# Patient Record
Sex: FEMALE | Race: WHITE | NOT HISPANIC OR LATINO | Employment: UNEMPLOYED | URBAN - METROPOLITAN AREA
[De-identification: names, ages, dates, MRNs, and addresses within clinical notes are randomized per-mention and may not be internally consistent; named-entity substitution may affect disease eponyms.]

---

## 2017-07-06 ENCOUNTER — HOSPITAL ENCOUNTER (EMERGENCY)
Facility: HOSPITAL | Age: 47
Discharge: HOME | End: 2017-07-07
Attending: EMERGENCY MEDICINE

## 2017-07-06 VITALS
WEIGHT: 286 LBS | RESPIRATION RATE: 20 BRPM | OXYGEN SATURATION: 96 % | HEART RATE: 91 BPM | SYSTOLIC BLOOD PRESSURE: 143 MMHG | DIASTOLIC BLOOD PRESSURE: 78 MMHG | TEMPERATURE: 98 F

## 2017-07-06 DIAGNOSIS — F41.9 ANXIETY: ICD-10-CM

## 2017-07-06 DIAGNOSIS — K05.10 GINGIVITIS: ICD-10-CM

## 2017-07-06 DIAGNOSIS — R10.9 LEFT FLANK PAIN: Primary | ICD-10-CM

## 2017-07-06 LAB
BILIRUB UR QL STRIP: NEGATIVE
CLARITY UR REFRACT.AUTO: CLEAR
COLOR UR AUTO: COLORLESS
GLUCOSE UR QL STRIP: NEGATIVE
HGB UR QL STRIP: ABNORMAL
KETONES UR QL STRIP: NEGATIVE
LEUKOCYTE ESTERASE UR QL STRIP: NEGATIVE
NITRITE UR QL STRIP: NEGATIVE
PH UR STRIP: 6 [PH] (ref 5–8)
PROT UR QL STRIP: NEGATIVE
SP GR UR STRIP: <=1.005 (ref 1–1.03)
URN SPEC COLLECT METH UR: ABNORMAL
UROBILINOGEN UR STRIP-ACNC: NEGATIVE EU/DL

## 2017-07-06 PROCEDURE — 81003 URINALYSIS AUTO W/O SCOPE: CPT

## 2017-07-06 PROCEDURE — 99283 EMERGENCY DEPT VISIT LOW MDM: CPT

## 2017-07-06 RX ORDER — CLINDAMYCIN HYDROCHLORIDE 150 MG/1
300 CAPSULE ORAL 4 TIMES DAILY
Qty: 56 CAPSULE | Refills: 0 | Status: SHIPPED | OUTPATIENT
Start: 2017-07-06 | End: 2017-07-13

## 2017-07-06 RX ORDER — IBUPROFEN 200 MG
200 TABLET ORAL EVERY 6 HOURS PRN
Status: ON HOLD | COMMUNITY
End: 2018-11-29 | Stop reason: HOSPADM

## 2017-07-07 NOTE — ED NOTES
Pt given test results and poc. She has verbalized understanding, and she denies having any further questions or concerns. Md is aware of pt's current bp, and he is ok with discharging the pt at this time. Pt denies having any further questions or concerns at this time. Pt will be discharged per md order.

## 2017-07-07 NOTE — ED PROVIDER NOTES
Encounter Date: 2017       History     Chief Complaint   Patient presents with    Flank Pain     L flank pain since , intermittent. reports frequent urination but has been drinking a lot of water. +nausea. pt very fidgety.    Dental Problem     pt reports bleeding gums from poor dental care.     HPI   .    History is provided by: patient.      Carla Naranjo is a 46 y.o. female presenting to the Emergency Department for vague left flank pain that began a few days ago, achy, no noticeable aggravation or alleviating factors.  Denies fever, chills, N/V, CP, SOB, dysuria.  Does admit to increased frequency.  Also complaining of bleeding and irritated gums.    PCP: Primary Doctor No    Review of patient's allergies indicates:  No Known Allergies  History reviewed. No pertinent past medical history.  Past Surgical History:   Procedure Laterality Date     SECTION       History reviewed. No pertinent family history.  Social History   Substance Use Topics    Smoking status: Never Smoker    Smokeless tobacco: Never Used    Alcohol use No     Review of Systems   Constitutional: Negative for fever.   HENT: Positive for dental problem. Negative for sore throat.    Respiratory: Negative for shortness of breath.    Cardiovascular: Negative for chest pain.   Gastrointestinal: Negative for nausea.   Genitourinary: Positive for flank pain, frequency and urgency. Negative for decreased urine volume and dysuria.   Musculoskeletal: Negative for back pain.   Skin: Negative for rash.   Neurological: Negative for weakness.   Hematological: Does not bruise/bleed easily.   Psychiatric/Behavioral: The patient is nervous/anxious.    All other systems reviewed and are negative.      Physical Exam     Initial Vitals   BP Pulse Resp Temp SpO2   178 176 17   (!) 187/101 95 20 97.6 °F (36.4 °C) 98 %      MAP       17       129.67         Physical  Exam  Nursing Notes and Vital Signs Reviewed.  Constitutional:  Well developed, well nourished.  She is awake & alert.  She is in no acute distress  Head:  Atraumatic.  Normocephalic.    Eyes:  PERRL.  EOMI.  Conjunctivae are not pale. No scleral icterus.  ENT:  Mucous membranes are moist and intact.  Oropharynx is clear and symmetric.  Diffuse gingival irritation with no active bleeding    Neck:  Supple. Full ROM.  No lymphadenopathy.  Cardiovascular:  Regular rate.  Regular rhythm. S1 and S2 heart sounds present.  No murmurs, rubs, or gallops.  Distal pulses are 2+ and symmetric.  Pulmonary/Chest:  No evidence of respiratory distress.  Clear to auscultation bilaterally.  No wheezing, rales or rhonchi.  Abdominal:  Soft and non-distended.  There is no tenderness.  No rebound, guarding, or rigidity.  Good bowel sounds.  No organomegaly.    Genitourinary: No CVA tenderness.  Musculoskeletal:  Moves all four extremities. No obvious deformities.  No edema. No calf tenderness.    Skin:  Skin is warm and dry. No rashes.      Neurological:  Alert, awake, and appropriate.  Pressured speech.  No acute focal neurological deficits are appreciated.  Psychiatric: Poor eye contact.  Appropriate in content/context. Anxious.    ED Course   Procedures  Labs Reviewed   URINALYSIS - Abnormal; Notable for the following:        Result Value    Color, UA Colorless (*)     Occult Blood UA Trace (*)     All other components within normal limits        ED ONGOING VITALS:  Vitals:    07/06/17 2244 07/06/17 2246 07/06/17 2248 07/06/17 2353   BP:   (!) 187/101 (!) 143/78   Pulse:  95  91   Resp:  20  20   Temp:  97.6 °F (36.4 °C)  98.2 °F (36.8 °C)   TempSrc:  Oral  Oral   SpO2:  98%  96%   Weight: 129.7 kg (286 lb)            ABNORMAL LAB VALUES:  Labs Reviewed   URINALYSIS - Abnormal; Notable for the following:        Result Value    Color, UA Colorless (*)     Occult Blood UA Trace (*)     All other components within normal limits          ALL LAB VALUES:  Results for orders placed or performed during the hospital encounter of 07/06/17   Urinalysis   Result Value Ref Range    Specimen UA Urine, Clean Catch     Color, UA Colorless (A) Yellow, Straw, Janice    Appearance, UA Clear Clear    pH, UA 6.0 5.0 - 8.0    Specific Gravity, UA <=1.005 1.005 - 1.030    Protein, UA Negative Negative    Glucose, UA Negative Negative    Ketones, UA Negative Negative    Bilirubin (UA) Negative Negative    Occult Blood UA Trace (A) Negative    Nitrite, UA Negative Negative    Urobilinogen, UA Negative <2.0 EU/dL    Leukocytes, UA Negative Negative           RADIOLOGY STUDIES:  Imaging Results    None           The above vital signs and test results have been reviewed by the emergency provider.       ED EVENTS:  During evaluation questioned patient about her nervousness.  States she is going through divorce and under stress.  Denies SI/HI.  Had lengthy discussion on need for follow-up she agrees.      Re-evaluation: The patient is resting comfortably and is in no acute distress. She states that her symptoms have improved after treatment within ER. Discussed test results, shared treatment plan, specific conditions for return, and importance of follow up with patient and family.  She understands and agrees with the plan as discussed. Answered  her questions at this time. She has remained hemodynamically stable throughout the ED course and is appropriate for discharge home.       Pre-hypertension/Hypertension: The pt has been informed that they may have pre-hypertension or hypertension based on a blood pressure reading in the ED. I recommend that the pt call the PCP listed on their discharge instructions or a physician of their choice this week to arrange f/u for further evaluation of possible pre-hypertension or hypertension.                          ED Course     Clinical Impression:   The primary encounter diagnosis was Left flank pain. Diagnoses of Gingivitis  and Anxiety were also pertinent to this visit.    Disposition:   Disposition: Discharged  Condition: Stable                        Juan A Garcia MD  07/07/17 0518

## 2017-11-09 ENCOUNTER — OFFICE VISIT (OUTPATIENT)
Dept: OBSTETRICS AND GYNECOLOGY | Facility: CLINIC | Age: 47
End: 2017-11-09
Payer: COMMERCIAL

## 2017-11-09 VITALS
DIASTOLIC BLOOD PRESSURE: 90 MMHG | WEIGHT: 286.81 LBS | BODY MASS INDEX: 43.47 KG/M2 | SYSTOLIC BLOOD PRESSURE: 132 MMHG | HEIGHT: 68 IN

## 2017-11-09 DIAGNOSIS — Z01.419 ENCOUNTER FOR WELL WOMAN EXAM WITH ROUTINE GYNECOLOGICAL EXAM: Primary | ICD-10-CM

## 2017-11-09 DIAGNOSIS — Z30.017 ENCOUNTER FOR INITIAL PRESCRIPTION OF IMPLANTABLE SUBDERMAL CONTRACEPTIVE: ICD-10-CM

## 2017-11-09 DIAGNOSIS — Z00.00 PREVENTATIVE HEALTH CARE: ICD-10-CM

## 2017-11-09 PROCEDURE — 88175 CYTOPATH C/V AUTO FLUID REDO: CPT

## 2017-11-09 PROCEDURE — 99999 PR PBB SHADOW E&M-EST. PATIENT-LVL III: CPT | Mod: PBBFAC,,, | Performed by: NURSE PRACTITIONER

## 2017-11-09 PROCEDURE — 99386 PREV VISIT NEW AGE 40-64: CPT | Mod: S$GLB,,, | Performed by: NURSE PRACTITIONER

## 2017-11-09 NOTE — PROGRESS NOTES
"CC: Well woman exam    Carla Naranjo is a 46 y.o. female  presents for well woman exam.  LMP: Patient's last menstrual period was 10/26/2017..  No issues, problems, or complaints. Cycles are every 30 days, not heavy. Cannot recall last pap exam, has never had a mammogram. Wants to discuss birth control options.     History reviewed. No pertinent past medical history.  Past Surgical History:   Procedure Laterality Date     SECTION       Social History     Social History    Marital status:      Spouse name: N/A    Number of children: N/A    Years of education: N/A     Occupational History    Not on file.     Social History Main Topics    Smoking status: Never Smoker    Smokeless tobacco: Never Used    Alcohol use Yes      Comment: once every 3 months     Drug use: No    Sexual activity: Not on file     Other Topics Concern    Not on file     Social History Narrative    No narrative on file     Family History   Problem Relation Age of Onset    Muscular dystrophy Paternal Grandmother      OB History      Para Term  AB Living    3 3 2 1        SAB TAB Ectopic Multiple Live Births                       BP (!) 132/90 (BP Location: Right arm, Patient Position: Sitting, BP Method: Large (Manual))   Ht 5' 8" (1.727 m)   Wt 130.1 kg (286 lb 13.1 oz)   LMP 10/26/2017   BMI 43.61 kg/m²       ROS:  GENERAL: Denies weight gain or weight loss. Feeling well overall.   SKIN: Denies rash or lesions.   HEAD: Denies head injury or headache.   NODES: Denies enlarged lymph nodes.   CHEST: Denies chest pain or shortness of breath.   CARDIOVASCULAR: Denies palpitations or left sided chest pain.   ABDOMEN: No abdominal pain, constipation, diarrhea, nausea, vomiting or rectal bleeding.   URINARY: No frequency, dysuria, hematuria, or burning on urination.  REPRODUCTIVE: See HPI.   BREASTS: The patient performs breast self-examination and denies pain, lumps, or nipple discharge. "   HEMATOLOGIC: No easy bruisability or excessive bleeding.   MUSCULOSKELETAL: Denies joint pain or swelling.   NEUROLOGIC: Denies syncope or weakness.   PSYCHIATRIC: Denies depression, anxiety or mood swings.    PHYSICAL EXAM:  APPEARANCE: Obese female, in no acute distress.  AFFECT: WNL, alert and oriented x 3  SKIN: No acne or hirsutism  NECK: Neck symmetric without masses or thyromegaly  NODES: No inguinal, cervical, axillary, or femoral lymph node enlargement  CHEST: Good respiratory effect  ABDOMEN: Soft.  No tenderness or masses.  No hepatosplenomegaly.  No hernias.  BREASTS: Symmetrical, no skin changes or visible lesions.  No palpable masses, nipple discharge bilaterally.  PELVIC: Normal external genitalia without lesions.  Normal hair distribution.  Adequate perineal body, normal urethral meatus.  Vagina moist and well rugated without lesions or discharge.  Cervix pink, without lesions, discharge or tenderness.  No significant cystocele or rectocele.  Bimanual exam shows uterus to be normal size, regular, mobile and nontender.  Adnexa without masses or tenderness.    EXTREMITIES: No edema.    1. Encounter for well woman exam with routine gynecological exam  Mammo Digital Screening Bilat with CAD    Liquid-based pap smear, screening   2. Preventative health care  Mammo Digital Screening Bilat with CAD    Liquid-based pap smear, screening    PLAN:  Pap exam  Mammogram  Patient wants to proceed with Nexplanon; paperwork signed    Patient was counseled today on A.C.S. Pap guidelines and recommendations for yearly pelvic exams, mammograms and monthly self breast exams; to see her PCP for other health maintenance.

## 2017-11-10 ENCOUNTER — TELEPHONE (OUTPATIENT)
Dept: OBSTETRICS AND GYNECOLOGY | Facility: CLINIC | Age: 47
End: 2017-11-10

## 2017-11-10 ENCOUNTER — HOSPITAL ENCOUNTER (OUTPATIENT)
Dept: RADIOLOGY | Facility: HOSPITAL | Age: 47
Discharge: HOME | End: 2017-11-10
Attending: NURSE PRACTITIONER
Payer: COMMERCIAL

## 2017-11-10 VITALS — BODY MASS INDEX: 43.65 KG/M2 | HEIGHT: 68 IN | WEIGHT: 288 LBS

## 2017-11-10 DIAGNOSIS — Z01.419 ENCOUNTER FOR WELL WOMAN EXAM WITH ROUTINE GYNECOLOGICAL EXAM: ICD-10-CM

## 2017-11-10 DIAGNOSIS — Z00.00 PREVENTATIVE HEALTH CARE: ICD-10-CM

## 2017-11-10 PROCEDURE — 77067 SCR MAMMO BI INCL CAD: CPT | Mod: 26,,, | Performed by: RADIOLOGY

## 2017-11-10 PROCEDURE — 77067 SCR MAMMO BI INCL CAD: CPT | Mod: TC

## 2017-11-10 NOTE — TELEPHONE ENCOUNTER
Pt called in regarding a call she received from the clinic. Informed pt that her mammo was normal. She inquired about her pap results, and I informed her that we have not received the results as of yet. Pt verbalized understanding.

## 2017-11-10 NOTE — TELEPHONE ENCOUNTER
----- Message from Vickie Hill NP sent at 11/10/2017  2:14 PM CST -----  Please call patient and inform  her that mammogram was normal.

## 2017-11-15 ENCOUNTER — TELEPHONE (OUTPATIENT)
Dept: OBSTETRICS AND GYNECOLOGY | Facility: CLINIC | Age: 47
End: 2017-11-15

## 2017-11-15 NOTE — TELEPHONE ENCOUNTER
----- Message from Esteban Hook sent at 11/15/2017  8:15 AM CST -----  Contact: pt  She's calling in regards to a missed call, [phone] (cell)

## 2017-11-22 ENCOUNTER — TELEPHONE (OUTPATIENT)
Dept: OBSTETRICS AND GYNECOLOGY | Facility: CLINIC | Age: 47
End: 2017-11-22

## 2017-11-22 NOTE — TELEPHONE ENCOUNTER
----- Message from Chas Hernandez sent at 11/22/2017 10:16 AM CST -----  Contact: Pt  Pt request a call from the nurse to see when she can schedule the apt for her Birth Control Implant, please contact the pt at [phone]

## 2017-11-22 NOTE — TELEPHONE ENCOUNTER
----- Message from Cyn Mccord sent at 11/22/2017 10:48 AM CST -----  Contact: Pt   Pt returning nurse call. .[phone] (ffdo)

## 2017-12-06 ENCOUNTER — TELEPHONE (OUTPATIENT)
Dept: OBSTETRICS AND GYNECOLOGY | Facility: CLINIC | Age: 47
End: 2017-12-06

## 2017-12-07 NOTE — TELEPHONE ENCOUNTER
Nexplanon received at the [facility] location.  Handed to Araceli to place in the Med Room.  Left voice message with patient to call office to schedule an appointment for insertion.

## 2017-12-08 ENCOUNTER — TELEPHONE (OUTPATIENT)
Dept: OBSTETRICS AND GYNECOLOGY | Facility: CLINIC | Age: 47
End: 2017-12-08

## 2017-12-08 NOTE — TELEPHONE ENCOUNTER
Attempted to contact pt to schedule nexplanon placement, no answer. Left patient a voicemail message to call the clinic back.

## 2017-12-11 ENCOUNTER — TELEPHONE (OUTPATIENT)
Dept: OBSTETRICS AND GYNECOLOGY | Facility: CLINIC | Age: 47
End: 2017-12-11

## 2017-12-11 NOTE — TELEPHONE ENCOUNTER
Attempted to contact patient, no answer.  Left patient a voicemail message on cell to call the clinic back. Tried to call home, rang several times and then went to busy tone.

## 2017-12-13 NOTE — TELEPHONE ENCOUNTER
Attempted to contact patient to schedule nexplanon placement , no answer.  Left patient a voicemail message at [phone] to call the clinic back.     Home number rings several times then goes to busy tone.

## 2018-01-17 ENCOUNTER — TELEPHONE (OUTPATIENT)
Dept: OBSTETRICS AND GYNECOLOGY | Facility: CLINIC | Age: 48
End: 2018-01-17

## 2018-01-17 NOTE — TELEPHONE ENCOUNTER
----- Message from Esteban Hook sent at 1/17/2018  3:19 PM CST -----  Contact: pt  She's calling in regard to scheduling a procedure for Birth control implant, please advise, [phone] (home)

## 2018-01-17 NOTE — TELEPHONE ENCOUNTER
Attempted to contact pt, pt unavailable. Phone rings a few times, and then gives busy signal. Unable to leave vm.

## 2018-02-07 ENCOUNTER — PROCEDURE VISIT (OUTPATIENT)
Dept: OBSTETRICS AND GYNECOLOGY | Facility: CLINIC | Age: 48
End: 2018-02-07
Payer: COMMERCIAL

## 2018-02-07 VITALS
DIASTOLIC BLOOD PRESSURE: 92 MMHG | BODY MASS INDEX: 34.21 KG/M2 | SYSTOLIC BLOOD PRESSURE: 160 MMHG | HEIGHT: 68 IN | WEIGHT: 225.75 LBS

## 2018-02-07 DIAGNOSIS — Z30.017 NEXPLANON INSERTION: Primary | ICD-10-CM

## 2018-02-07 PROCEDURE — 11981 INSERTION DRUG DLVR IMPLANT: CPT | Mod: S$GLB,,, | Performed by: ADVANCED PRACTICE MIDWIFE

## 2018-02-07 NOTE — PROCEDURES
Procedures     Nexplanon INSERTION:    PRE-Nexplanon INSERTION COUNSELING:  All contraceptive options were reviewed and the patient chooses Nexplanon.  Patients history was reviewed and there were no contraindications to Nexplanon.  The procedure and minimal risks of pain, bleeding, bruising and infection at the insertion site discussed. Possible irregular menstrual bleeding pattern versus amenorrhea was explained.  No protection against STDs discussed.  Written information provided; all questions answered and patient agrees to proceed.  Consent signed and scanned into computer.    EXAM:  With patient in supine position the nondominant Left arm was flexed at the elbow and externally rotated.  The insertion site was identified 6-8 cm above the elbow crease at the inner side of the upper arm overlying the groove between biceps and triceps.    PROCEDURE:  The insertion site was prepped with antiseptic and injected with 3 cc of 1% Xylocaine without epinephrine subq along the planned insertion canal.    Using sterile technique the Nexplanon applicator was visually verified and removed from the blister pack.  The needle tip was inserted bevel side up at a 20 degree angle to penetrate the skin.  The applicator was lowered parallel to the arm and the skin was tented with the needle.  Once the needle was completely inserted, the Nexplanon was then deployed into the subcutaneous space.  The implant was palpable after insertion.  A steri strip was placed over the insertion site.  The patient tolerated the procedure well.    ASSESSMENT:  Nexplanon Insertion    POST Nexplanon INSERTION COUNSELING:  Manage post Implanon placement arm pain with NSAIDs  Keep arm elevated and apply intermittent ice packs to decrease pain and bruising for 24 Hours.  May remove bandage in 24 hours.  Nexplanon danger signs (worsening pain at insertion site, bleeding through bandage, redness and/or pus drainage at insertion site).  Removal in 3  years.    Lot # [id number]  Exp 01/2020

## 2018-11-26 ENCOUNTER — HOSPITAL ENCOUNTER (INPATIENT)
Facility: HOSPITAL | Age: 48
LOS: 3 days | Discharge: HOME | DRG: 558 | End: 2018-11-29
Attending: EMERGENCY MEDICINE | Admitting: HOSPITALIST
Payer: COMMERCIAL

## 2018-11-26 DIAGNOSIS — R74.8 ELEVATED LIVER ENZYMES: ICD-10-CM

## 2018-11-26 DIAGNOSIS — N39.0 URINARY TRACT INFECTION WITHOUT HEMATURIA, SITE UNSPECIFIED: ICD-10-CM

## 2018-11-26 DIAGNOSIS — R11.2 INTRACTABLE VOMITING WITH NAUSEA, UNSPECIFIED VOMITING TYPE: ICD-10-CM

## 2018-11-26 DIAGNOSIS — E87.1 HYPONATREMIA: ICD-10-CM

## 2018-11-26 DIAGNOSIS — E86.0 DEHYDRATION: ICD-10-CM

## 2018-11-26 DIAGNOSIS — M62.82 RHABDOMYOLYSIS: Primary | ICD-10-CM

## 2018-11-26 DIAGNOSIS — R03.0 ELEVATED BLOOD PRESSURE READING WITHOUT DIAGNOSIS OF HYPERTENSION: ICD-10-CM

## 2018-11-26 LAB
ALBUMIN SERPL BCP-MCNC: 4.1 G/DL
ALP SERPL-CCNC: 151 U/L
ALT SERPL W/O P-5'-P-CCNC: 874 U/L
AMYLASE SERPL-CCNC: 47 U/L
ANION GAP SERPL CALC-SCNC: 15 MMOL/L
ANION GAP SERPL CALC-SCNC: 16 MMOL/L
APAP SERPL-MCNC: <3 UG/ML
AST SERPL-CCNC: 364 U/L
B-HCG UR QL: NEGATIVE
BACTERIA #/AREA URNS AUTO: ABNORMAL /HPF
BASOPHILS # BLD AUTO: 0.02 K/UL
BASOPHILS NFR BLD: 0.1 %
BILIRUB SERPL-MCNC: 0.8 MG/DL
BILIRUB UR QL STRIP: NEGATIVE
BUN SERPL-MCNC: 28 MG/DL
BUN SERPL-MCNC: 38 MG/DL
CALCIUM SERPL-MCNC: 9.4 MG/DL
CALCIUM SERPL-MCNC: 9.5 MG/DL
CHLORIDE SERPL-SCNC: 96 MMOL/L
CHLORIDE SERPL-SCNC: 98 MMOL/L
CK SERPL-CCNC: 2637 U/L
CLARITY UR REFRACT.AUTO: CLEAR
CO2 SERPL-SCNC: 14 MMOL/L
CO2 SERPL-SCNC: 17 MMOL/L
COLOR UR AUTO: YELLOW
CREAT SERPL-MCNC: 1.3 MG/DL
CREAT SERPL-MCNC: 1.5 MG/DL
DIFFERENTIAL METHOD: ABNORMAL
EOSINOPHIL # BLD AUTO: 0 K/UL
EOSINOPHIL NFR BLD: 0.1 %
ERYTHROCYTE [DISTWIDTH] IN BLOOD BY AUTOMATED COUNT: 13.2 %
EST. GFR  (AFRICAN AMERICAN): 47.5 ML/MIN/1.73 M^2
EST. GFR  (AFRICAN AMERICAN): 56 ML/MIN/1.73 M^2
EST. GFR  (NON AFRICAN AMERICAN): 41.2 ML/MIN/1.73 M^2
EST. GFR  (NON AFRICAN AMERICAN): 49 ML/MIN/1.73 M^2
ETHANOL SERPL-MCNC: <10 MG/DL
GLUCOSE SERPL-MCNC: 115 MG/DL
GLUCOSE SERPL-MCNC: 133 MG/DL
GLUCOSE UR QL STRIP: NEGATIVE
HCT VFR BLD AUTO: 39.4 %
HGB BLD-MCNC: 14.5 G/DL
HGB UR QL STRIP: ABNORMAL
HYALINE CASTS UR QL AUTO: 0 /LPF
INR PPP: 1.2
KETONES UR QL STRIP: ABNORMAL
LEUKOCYTE ESTERASE UR QL STRIP: ABNORMAL
LIPASE SERPL-CCNC: 112 U/L
LYMPHOCYTES # BLD AUTO: 0.6 K/UL
LYMPHOCYTES NFR BLD: 3.2 %
MAGNESIUM SERPL-MCNC: 2.1 MG/DL
MCH RBC QN AUTO: 29.5 PG
MCHC RBC AUTO-ENTMCNC: 36.8 G/DL
MCV RBC AUTO: 80 FL
MICROSCOPIC COMMENT: ABNORMAL
MONOCYTES # BLD AUTO: 2.3 K/UL
MONOCYTES NFR BLD: 12.2 %
NEUTROPHILS # BLD AUTO: 15.8 K/UL
NEUTROPHILS NFR BLD: 84.4 %
NITRITE UR QL STRIP: POSITIVE
PH UR STRIP: 6 [PH] (ref 5–8)
PHOSPHATE SERPL-MCNC: 1.8 MG/DL
PLATELET # BLD AUTO: 447 K/UL
PMV BLD AUTO: 10.8 FL
POTASSIUM SERPL-SCNC: 3.4 MMOL/L
POTASSIUM SERPL-SCNC: 3.5 MMOL/L
PROT SERPL-MCNC: 8.4 G/DL
PROT UR QL STRIP: ABNORMAL
PROTHROMBIN TIME: 13.2 SEC
RBC # BLD AUTO: 4.91 M/UL
RBC #/AREA URNS AUTO: 1 /HPF (ref 0–4)
SODIUM SERPL-SCNC: 126 MMOL/L
SODIUM SERPL-SCNC: 130 MMOL/L
SP GR UR STRIP: 1.01 (ref 1–1.03)
SQUAMOUS #/AREA URNS AUTO: 2 /HPF
TROPONIN I SERPL DL<=0.01 NG/ML-MCNC: 0.01 NG/ML
URN SPEC COLLECT METH UR: ABNORMAL
UROBILINOGEN UR STRIP-ACNC: NEGATIVE EU/DL
WBC # BLD AUTO: 18.86 K/UL
WBC #/AREA URNS AUTO: 3 /HPF (ref 0–5)

## 2018-11-26 PROCEDURE — 82550 ASSAY OF CK (CPK): CPT

## 2018-11-26 PROCEDURE — 87186 SC STD MICRODIL/AGAR DIL: CPT

## 2018-11-26 PROCEDURE — 25000003 PHARM REV CODE 250: Performed by: NURSE PRACTITIONER

## 2018-11-26 PROCEDURE — 80053 COMPREHEN METABOLIC PANEL: CPT

## 2018-11-26 PROCEDURE — 86703 HIV-1/HIV-2 1 RESULT ANTBDY: CPT

## 2018-11-26 PROCEDURE — 84100 ASSAY OF PHOSPHORUS: CPT

## 2018-11-26 PROCEDURE — 25000003 PHARM REV CODE 250: Performed by: INTERNAL MEDICINE

## 2018-11-26 PROCEDURE — 63600175 PHARM REV CODE 636 W HCPCS: Performed by: EMERGENCY MEDICINE

## 2018-11-26 PROCEDURE — 80074 ACUTE HEPATITIS PANEL: CPT

## 2018-11-26 PROCEDURE — 63600175 PHARM REV CODE 636 W HCPCS: Performed by: HOSPITALIST

## 2018-11-26 PROCEDURE — 99900035 HC TECH TIME PER 15 MIN (STAT)

## 2018-11-26 PROCEDURE — 96375 TX/PRO/DX INJ NEW DRUG ADDON: CPT

## 2018-11-26 PROCEDURE — 85610 PROTHROMBIN TIME: CPT

## 2018-11-26 PROCEDURE — 93010 ELECTROCARDIOGRAM REPORT: CPT | Mod: ,,, | Performed by: NUCLEAR MEDICINE

## 2018-11-26 PROCEDURE — 83036 HEMOGLOBIN GLYCOSYLATED A1C: CPT

## 2018-11-26 PROCEDURE — 80329 ANALGESICS NON-OPIOID 1 OR 2: CPT

## 2018-11-26 PROCEDURE — 80061 LIPID PANEL: CPT

## 2018-11-26 PROCEDURE — 96365 THER/PROPH/DIAG IV INF INIT: CPT

## 2018-11-26 PROCEDURE — 99285 EMERGENCY DEPT VISIT HI MDM: CPT | Mod: 25

## 2018-11-26 PROCEDURE — 81000 URINALYSIS NONAUTO W/SCOPE: CPT

## 2018-11-26 PROCEDURE — 87086 URINE CULTURE/COLONY COUNT: CPT

## 2018-11-26 PROCEDURE — 82803 BLOOD GASES ANY COMBINATION: CPT

## 2018-11-26 PROCEDURE — 96361 HYDRATE IV INFUSION ADD-ON: CPT

## 2018-11-26 PROCEDURE — 25500020 PHARM REV CODE 255: Performed by: EMERGENCY MEDICINE

## 2018-11-26 PROCEDURE — 80048 BASIC METABOLIC PNL TOTAL CA: CPT

## 2018-11-26 PROCEDURE — 25000003 PHARM REV CODE 250: Performed by: HOSPITALIST

## 2018-11-26 PROCEDURE — 25000003 PHARM REV CODE 250: Performed by: EMERGENCY MEDICINE

## 2018-11-26 PROCEDURE — 82150 ASSAY OF AMYLASE: CPT

## 2018-11-26 PROCEDURE — 87077 CULTURE AEROBIC IDENTIFY: CPT

## 2018-11-26 PROCEDURE — 36415 COLL VENOUS BLD VENIPUNCTURE: CPT

## 2018-11-26 PROCEDURE — 84484 ASSAY OF TROPONIN QUANT: CPT

## 2018-11-26 PROCEDURE — 87088 URINE BACTERIA CULTURE: CPT

## 2018-11-26 PROCEDURE — 96376 TX/PRO/DX INJ SAME DRUG ADON: CPT

## 2018-11-26 PROCEDURE — 11000001 HC ACUTE MED/SURG PRIVATE ROOM

## 2018-11-26 PROCEDURE — 85025 COMPLETE CBC W/AUTO DIFF WBC: CPT

## 2018-11-26 PROCEDURE — 82977 ASSAY OF GGT: CPT

## 2018-11-26 PROCEDURE — 81025 URINE PREGNANCY TEST: CPT

## 2018-11-26 PROCEDURE — 83690 ASSAY OF LIPASE: CPT

## 2018-11-26 PROCEDURE — 80320 DRUG SCREEN QUANTALCOHOLS: CPT

## 2018-11-26 PROCEDURE — 83735 ASSAY OF MAGNESIUM: CPT

## 2018-11-26 RX ORDER — MORPHINE SULFATE 4 MG/ML
4 INJECTION, SOLUTION INTRAMUSCULAR; INTRAVENOUS
Status: COMPLETED | OUTPATIENT
Start: 2018-11-26 | End: 2018-11-26

## 2018-11-26 RX ORDER — CLONIDINE HYDROCHLORIDE 0.1 MG/1
0.1 TABLET ORAL
Status: COMPLETED | OUTPATIENT
Start: 2018-11-26 | End: 2018-11-26

## 2018-11-26 RX ORDER — ENOXAPARIN SODIUM 100 MG/ML
40 INJECTION SUBCUTANEOUS EVERY 24 HOURS
Status: DISCONTINUED | OUTPATIENT
Start: 2018-11-26 | End: 2018-11-29 | Stop reason: HOSPADM

## 2018-11-26 RX ORDER — ONDANSETRON 2 MG/ML
4 INJECTION INTRAMUSCULAR; INTRAVENOUS
Status: COMPLETED | OUTPATIENT
Start: 2018-11-26 | End: 2018-11-26

## 2018-11-26 RX ORDER — SODIUM,POTASSIUM PHOSPHATES 280-250MG
1 POWDER IN PACKET (EA) ORAL
Status: DISCONTINUED | OUTPATIENT
Start: 2018-11-26 | End: 2018-11-29 | Stop reason: HOSPADM

## 2018-11-26 RX ORDER — DEXTROSE MONOHYDRATE AND SODIUM CHLORIDE 5; .45 G/100ML; G/100ML
INJECTION, SOLUTION INTRAVENOUS CONTINUOUS
Status: DISCONTINUED | OUTPATIENT
Start: 2018-11-26 | End: 2018-11-26

## 2018-11-26 RX ORDER — POTASSIUM CHLORIDE 20 MEQ/15ML
40 SOLUTION ORAL ONCE
Status: COMPLETED | OUTPATIENT
Start: 2018-11-26 | End: 2018-11-26

## 2018-11-26 RX ORDER — SODIUM CHLORIDE 450 MG/100ML
INJECTION, SOLUTION INTRAVENOUS CONTINUOUS
Status: DISCONTINUED | OUTPATIENT
Start: 2018-11-26 | End: 2018-11-26

## 2018-11-26 RX ORDER — HYDROMORPHONE HYDROCHLORIDE 2 MG/ML
1 INJECTION, SOLUTION INTRAMUSCULAR; INTRAVENOUS; SUBCUTANEOUS
Status: COMPLETED | OUTPATIENT
Start: 2018-11-26 | End: 2018-11-26

## 2018-11-26 RX ORDER — IBUPROFEN 200 MG
16 TABLET ORAL
Status: DISCONTINUED | OUTPATIENT
Start: 2018-11-26 | End: 2018-11-29 | Stop reason: HOSPADM

## 2018-11-26 RX ORDER — GLUCAGON 1 MG
1 KIT INJECTION
Status: DISCONTINUED | OUTPATIENT
Start: 2018-11-26 | End: 2018-11-29 | Stop reason: HOSPADM

## 2018-11-26 RX ORDER — PANTOPRAZOLE SODIUM 40 MG/1
40 TABLET, DELAYED RELEASE ORAL DAILY
Status: DISCONTINUED | OUTPATIENT
Start: 2018-11-27 | End: 2018-11-27

## 2018-11-26 RX ORDER — ONDANSETRON 2 MG/ML
4 INJECTION INTRAMUSCULAR; INTRAVENOUS EVERY 8 HOURS PRN
Status: DISCONTINUED | OUTPATIENT
Start: 2018-11-26 | End: 2018-11-29 | Stop reason: HOSPADM

## 2018-11-26 RX ORDER — DEXTROSE MONOHYDRATE AND SODIUM CHLORIDE 5; .45 G/100ML; G/100ML
INJECTION, SOLUTION INTRAVENOUS CONTINUOUS
Status: DISCONTINUED | OUTPATIENT
Start: 2018-11-27 | End: 2018-11-27

## 2018-11-26 RX ORDER — CLONIDINE HYDROCHLORIDE 0.1 MG/1
0.1 TABLET ORAL
Status: DISCONTINUED | OUTPATIENT
Start: 2018-11-26 | End: 2018-11-26

## 2018-11-26 RX ORDER — AMLODIPINE BESYLATE 10 MG/1
10 TABLET ORAL DAILY
Status: DISCONTINUED | OUTPATIENT
Start: 2018-11-26 | End: 2018-11-29 | Stop reason: HOSPADM

## 2018-11-26 RX ORDER — ACETAMINOPHEN 325 MG/1
650 TABLET ORAL EVERY 8 HOURS PRN
Status: DISCONTINUED | OUTPATIENT
Start: 2018-11-26 | End: 2018-11-26

## 2018-11-26 RX ORDER — RAMELTEON 8 MG/1
8 TABLET ORAL NIGHTLY PRN
Status: DISCONTINUED | OUTPATIENT
Start: 2018-11-26 | End: 2018-11-29 | Stop reason: HOSPADM

## 2018-11-26 RX ORDER — IBUPROFEN 200 MG
24 TABLET ORAL
Status: DISCONTINUED | OUTPATIENT
Start: 2018-11-26 | End: 2018-11-29 | Stop reason: HOSPADM

## 2018-11-26 RX ORDER — SODIUM CHLORIDE 0.9 % (FLUSH) 0.9 %
5 SYRINGE (ML) INJECTION
Status: DISCONTINUED | OUTPATIENT
Start: 2018-11-26 | End: 2018-11-29 | Stop reason: HOSPADM

## 2018-11-26 RX ORDER — HYDRALAZINE HYDROCHLORIDE 20 MG/ML
10 INJECTION INTRAMUSCULAR; INTRAVENOUS EVERY 6 HOURS PRN
Status: DISCONTINUED | OUTPATIENT
Start: 2018-11-26 | End: 2018-11-29 | Stop reason: HOSPADM

## 2018-11-26 RX ADMIN — IOHEXOL 30 ML: 350 INJECTION, SOLUTION INTRAVENOUS at 01:11

## 2018-11-26 RX ADMIN — PROMETHAZINE HYDROCHLORIDE 12.5 MG: 25 INJECTION INTRAMUSCULAR; INTRAVENOUS at 11:11

## 2018-11-26 RX ADMIN — IOHEXOL 75 ML: 350 INJECTION, SOLUTION INTRAVENOUS at 01:11

## 2018-11-26 RX ADMIN — SODIUM CHLORIDE 1000 ML: 0.9 INJECTION, SOLUTION INTRAVENOUS at 09:11

## 2018-11-26 RX ADMIN — FOLIC ACID: 5 INJECTION, SOLUTION INTRAMUSCULAR; INTRAVENOUS; SUBCUTANEOUS at 08:11

## 2018-11-26 RX ADMIN — ENOXAPARIN SODIUM 40 MG: 100 INJECTION SUBCUTANEOUS at 08:11

## 2018-11-26 RX ADMIN — HYDROMORPHONE HYDROCHLORIDE 1 MG: 2 INJECTION INTRAMUSCULAR; INTRAVENOUS; SUBCUTANEOUS at 04:11

## 2018-11-26 RX ADMIN — MORPHINE SULFATE 4 MG: 4 INJECTION INTRAVENOUS at 12:11

## 2018-11-26 RX ADMIN — ONDANSETRON 4 MG: 2 INJECTION, SOLUTION INTRAMUSCULAR; INTRAVENOUS at 09:11

## 2018-11-26 RX ADMIN — POTASSIUM, SODIUM PHOSPHATES 280 MG-160 MG-250 MG ORAL POWDER PACKET 1 PACKET: POWDER IN PACKET at 09:11

## 2018-11-26 RX ADMIN — RAMELTEON 8 MG: 8 TABLET, FILM COATED ORAL at 10:11

## 2018-11-26 RX ADMIN — ONDANSETRON 4 MG: 2 INJECTION, SOLUTION INTRAMUSCULAR; INTRAVENOUS at 04:11

## 2018-11-26 RX ADMIN — CLONIDINE HYDROCHLORIDE 0.1 MG: 0.1 TABLET ORAL at 10:11

## 2018-11-26 RX ADMIN — POTASSIUM CHLORIDE 40 MEQ: 20 SOLUTION ORAL at 09:11

## 2018-11-26 RX ADMIN — CEFTRIAXONE 1 G: 1 INJECTION, SOLUTION INTRAVENOUS at 10:11

## 2018-11-26 RX ADMIN — AMLODIPINE BESYLATE 10 MG: 10 TABLET ORAL at 08:11

## 2018-11-26 NOTE — ED NOTES
Pt updated on [facility] arrival and family members waiting for her at hospital, Pt resting in bed. NAD, VSS, RR equal and unlabored. Will continue to monitor.

## 2018-11-26 NOTE — ED PROVIDER NOTES
Encounter Date: 2018       History     Chief Complaint   Patient presents with    Emesis      diarrhea and vomiting since wed and urinary freq and burning on fri.chest wall pain for 2 days    Diarrhea     The history is provided by the patient.   Emesis    This is a new problem. The current episode started several days ago. The problem occurs 5 - 10 times per day. The problem has been unchanged. The emesis has an appearance of stomach contents. Associated symptoms include diarrhea. Pertinent negatives include no abdominal pain, no arthralgias, no chills, no cough, no fever, no headaches, no myalgias, no sweats and no URI. Risk factors include suspect food intake.   Diarrhea    Associated symptoms include vomiting. Pertinent negatives include no abdominal pain, arthralgias, chills, coughing, fever, headaches, myalgias, sweats or URI.     Review of patient's allergies indicates:  No Known Allergies  History reviewed. No pertinent past medical history.  Past Surgical History:   Procedure Laterality Date     SECTION       Family History   Problem Relation Age of Onset    Muscular dystrophy Paternal Grandmother      Social History     Tobacco Use    Smoking status: Never Smoker    Smokeless tobacco: Never Used   Substance Use Topics    Alcohol use: Yes     Comment: once every 3 months     Drug use: No     Review of Systems   Constitutional: Negative for chills and fever.   Respiratory: Positive for chest tightness. Negative for cough.    Gastrointestinal: Positive for diarrhea and vomiting. Negative for abdominal pain.   Genitourinary: Positive for dysuria.   Musculoskeletal: Negative for arthralgias and myalgias.   Neurological: Negative for headaches.       Physical Exam     Initial Vitals [18 0903]   BP Pulse Resp Temp SpO2   (!) 215/101 102 20 98.9 °F (37.2 °C) 100 %      MAP       --         Physical Exam    Nursing note and vitals reviewed.  Constitutional: She appears well-developed and  well-nourished. No distress.   HENT:   Head: Normocephalic and atraumatic.   Mouth/Throat: Uvula is midline. Mucous membranes are dry.   Eyes: EOM are normal. Pupils are equal, round, and reactive to light.   Neck: Normal range of motion. Neck supple.   Cardiovascular: Normal rate, regular rhythm and normal heart sounds.   Pulmonary/Chest: Breath sounds normal. No respiratory distress. She has no wheezes. She has no rales.   Abdominal: Soft. Bowel sounds are normal. She exhibits no distension. There is no tenderness.   Musculoskeletal: Normal range of motion.   Neurological: She is alert and oriented to person, place, and time. She has normal strength.   Skin: Skin is warm and dry.   Psychiatric: She has a normal mood and affect.         ED Course   Procedures  Labs Reviewed   CBC W/ AUTO DIFFERENTIAL - Abnormal; Notable for the following components:       Result Value    WBC 18.86 (*)     MCV 80 (*)     MCHC 36.8 (*)     Platelets 447 (*)     Gran # (ANC) 15.8 (*)     Lymph # 0.6 (*)     Mono # 2.3 (*)     Gran% 84.4 (*)     Lymph% 3.2 (*)     All other components within normal limits   COMPREHENSIVE METABOLIC PANEL - Abnormal; Notable for the following components:    Sodium 126 (*)     Potassium 3.4 (*)     CO2 14 (*)     Glucose 133 (*)     BUN, Bld 38 (*)     Creatinine 1.5 (*)     Alkaline Phosphatase 151 (*)      (*)      (*)     eGFR if  47.5 (*)     eGFR if non  41.2 (*)     All other components within normal limits   LIPASE - Abnormal; Notable for the following components:    Lipase 112 (*)     All other components within normal limits   URINALYSIS, REFLEX TO URINE CULTURE - Abnormal; Notable for the following components:    Protein, UA 1+ (*)     Ketones, UA Trace (*)     Occult Blood UA 3+ (*)     Nitrite, UA Positive (*)     Leukocytes, UA Trace (*)     All other components within normal limits    Narrative:     Preferred Collection Type->Urine, Clean Catch    URINALYSIS MICROSCOPIC - Abnormal; Notable for the following components:    Bacteria, UA Many (*)     All other components within normal limits    Narrative:     Preferred Collection Type->Urine, Clean Catch   CULTURE, URINE   CULTURE, URINE   TROPONIN I   PREGNANCY TEST, URINE RAPID   PREGNANCY TEST, URINE RAPID    Narrative:     Preferred Collection Type->Urine, Clean Catch     Results for orders placed or performed during the hospital encounter of 11/26/18   CBC W/ AUTO DIFFERENTIAL   Result Value Ref Range    WBC 18.86 (H) 3.90 - 12.70 K/uL    RBC 4.91 4.00 - 5.40 M/uL    Hemoglobin 14.5 12.0 - 16.0 g/dL    Hematocrit 39.4 37.0 - 48.5 %    MCV 80 (L) 82 - 98 fL    MCH 29.5 27.0 - 31.0 pg    MCHC 36.8 (H) 32.0 - 36.0 g/dL    RDW 13.2 11.5 - 14.5 %    Platelets 447 (H) 150 - 350 K/uL    MPV 10.8 9.2 - 12.9 fL    Gran # (ANC) 15.8 (H) 1.8 - 7.7 K/uL    Lymph # 0.6 (L) 1.0 - 4.8 K/uL    Mono # 2.3 (H) 0.3 - 1.0 K/uL    Eos # 0.0 0.0 - 0.5 K/uL    Baso # 0.02 0.00 - 0.20 K/uL    Gran% 84.4 (H) 38.0 - 73.0 %    Lymph% 3.2 (L) 18.0 - 48.0 %    Mono% 12.2 4.0 - 15.0 %    Eosinophil% 0.1 0.0 - 8.0 %    Basophil% 0.1 0.0 - 1.9 %    Differential Method Automated    Comp. Metabolic Panel   Result Value Ref Range    Sodium 126 (L) 136 - 145 mmol/L    Potassium 3.4 (L) 3.5 - 5.1 mmol/L    Chloride 96 95 - 110 mmol/L    CO2 14 (L) 23 - 29 mmol/L    Glucose 133 (H) 70 - 110 mg/dL    BUN, Bld 38 (H) 6 - 20 mg/dL    Creatinine 1.5 (H) 0.5 - 1.4 mg/dL    Calcium 9.4 8.7 - 10.5 mg/dL    Total Protein 8.4 6.0 - 8.4 g/dL    Albumin 4.1 3.5 - 5.2 g/dL    Total Bilirubin 0.8 0.1 - 1.0 mg/dL    Alkaline Phosphatase 151 (H) 55 - 135 U/L     (H) 10 - 40 U/L     (H) 10 - 44 U/L    Anion Gap 16 8 - 16 mmol/L    eGFR if African American 47.5 (A) >60 mL/min/1.73 m^2    eGFR if non  41.2 (A) >60 mL/min/1.73 m^2   Lipase   Result Value Ref Range    Lipase 112 (H) 4 - 60 U/L   Urinalysis, Reflex to Urine Culture  Urine, Clean Catch   Result Value Ref Range    Specimen UA Urine, Clean Catch     Color, UA Yellow Yellow, Straw, Janice    Appearance, UA Clear Clear    pH, UA 6.0 5.0 - 8.0    Specific Gravity, UA 1.015 1.005 - 1.030    Protein, UA 1+ (A) Negative    Glucose, UA Negative Negative    Ketones, UA Trace (A) Negative    Bilirubin (UA) Negative Negative    Occult Blood UA 3+ (A) Negative    Nitrite, UA Positive (A) Negative    Urobilinogen, UA Negative <2.0 EU/dL    Leukocytes, UA Trace (A) Negative   Troponin I   Result Value Ref Range    Troponin I 0.012 0.000 - 0.026 ng/mL   Urinalysis Microscopic   Result Value Ref Range    RBC, UA 1 0 - 4 /hpf    WBC, UA 3 0 - 5 /hpf    Bacteria, UA Many (A) None-Occ /hpf    Squam Epithel, UA 2 /hpf    Hyaline Casts, UA 0 0-1/lpf /lpf    Microscopic Comment SEE COMMENT    Pregnancy, urine rapid   Result Value Ref Range    Preg Test, Ur Negative        EKG Readings: (Independently Interpreted)   Initial Reading: No STEMI. Rhythm: Sinus Arrhythmia. Heart Rate: 97. Ectopy: No Ectopy. ST Segments: Non-Specific ST Segment Depression. T Waves: Normal. Axis: Normal. Clinical Impression: Sinus Arrhythmia       Imaging Results          X-Ray Abdomen Flat And Erect (Final result)  Result time 11/26/18 09:39:30    Final result by Memo Zepeda MD (11/26/18 09:39:30)                 Impression:      No acute kinks.      Electronically signed by: Memo Zepeda MD  Date:    11/26/2018  Time:    09:39             Narrative:    EXAMINATION:  XR ABDOMEN FLAT AND ERECT    CLINICAL HISTORY:  Abdominal Pain;    COMPARISON:  None.    FINDINGS:  The bowel gas pattern is unremarkable. No obstruction, ileus or free air.    Thoracolumbar scoliosis and degenerative disc disease are incidentally noted.                            11:33 AM Discussed case with Mary JULES will admit to Dr Krishna for IVFs and ABX.  11:34 AM Discussed lab/imaging studies with patient and the need for further  evaluation/admission for Intractable vomiting. Pt verbalized understanding that this is a stand alone ER and we are unable to admit at this facility. Pt will be [facility] with care en route to include ivFS abx. I discussed this case with Mary JULES and care was accepted by Dr Krishna.                              Clinical Impression:   The primary encounter diagnosis was Intractable vomiting with nausea, unspecified vomiting type. Diagnoses of Dehydration, Elevated liver enzymes, Hyponatremia, Urinary tract infection without hematuria, site unspecified, and Elevated blood pressure reading without diagnosis of hypertension were also pertinent to this visit.      Disposition:   Disposition: Admitted  Condition: Fair                        Kristofer Cain MD  11/26/18 6868

## 2018-11-26 NOTE — PROGRESS NOTES
Report received from STACY Vogt in [facility]. [facility] will be contacted for pt transport to Telemetry rm [id number].

## 2018-11-26 NOTE — ED NOTES
Patient in bed resting. No distress noted at this time. Vital signs stable. Respirations equal and unlabored. No complaints, concerns, or questions at this time. Will continue to monitor.

## 2018-11-27 PROBLEM — N30.01 ACUTE CYSTITIS WITH HEMATURIA: Status: ACTIVE | Noted: 2018-11-27

## 2018-11-27 PROBLEM — N30.00 ACUTE CYSTITIS: Status: ACTIVE | Noted: 2018-11-27

## 2018-11-27 PROBLEM — F32.A DEPRESSION: Chronic | Status: ACTIVE | Noted: 2018-11-27

## 2018-11-27 PROBLEM — F41.9 ANXIETY: Chronic | Status: ACTIVE | Noted: 2018-11-27

## 2018-11-27 PROBLEM — N17.9 AKI (ACUTE KIDNEY INJURY): Status: RESOLVED | Noted: 2018-11-27 | Resolved: 2018-11-27

## 2018-11-27 PROBLEM — I10 ESSENTIAL HYPERTENSION: Chronic | Status: ACTIVE | Noted: 2018-11-27

## 2018-11-27 PROBLEM — N17.9 AKI (ACUTE KIDNEY INJURY): Status: ACTIVE | Noted: 2018-11-27

## 2018-11-27 PROBLEM — E87.6 HYPOKALEMIA: Status: ACTIVE | Noted: 2018-11-27

## 2018-11-27 PROBLEM — F41.9 ANXIETY AND DEPRESSION: Status: ACTIVE | Noted: 2018-11-27

## 2018-11-27 PROBLEM — M62.82 RHABDOMYOLYSIS: Status: ACTIVE | Noted: 2018-11-27

## 2018-11-27 PROBLEM — F32.A ANXIETY AND DEPRESSION: Status: ACTIVE | Noted: 2018-11-27

## 2018-11-27 PROBLEM — R74.8 ELEVATED LIVER ENZYMES: Status: ACTIVE | Noted: 2018-11-27

## 2018-11-27 PROBLEM — E87.1 HYPONATREMIA: Status: ACTIVE | Noted: 2018-11-27

## 2018-11-27 PROBLEM — K29.80 DUODENITIS WITHOUT BLEEDING: Status: ACTIVE | Noted: 2018-11-27

## 2018-11-27 PROBLEM — F19.10 POLYSUBSTANCE ABUSE: Chronic | Status: ACTIVE | Noted: 2018-11-27

## 2018-11-27 LAB
ALBUMIN SERPL BCP-MCNC: 3.4 G/DL
ALP SERPL-CCNC: 121 U/L
ALT SERPL W/O P-5'-P-CCNC: 549 U/L
AMPHET+METHAMPHET UR QL: NEGATIVE
ANION GAP SERPL CALC-SCNC: 14 MMOL/L
ANION GAP SERPL CALC-SCNC: 14 MMOL/L
ANION GAP SERPL CALC-SCNC: 15 MMOL/L
AST SERPL-CCNC: 218 U/L
BARBITURATES UR QL SCN>200 NG/ML: NEGATIVE
BASOPHILS # BLD AUTO: 0 K/UL
BASOPHILS NFR BLD: 0 %
BENZODIAZ UR QL SCN>200 NG/ML: NEGATIVE
BILIRUB SERPL-MCNC: 0.8 MG/DL
BUN SERPL-MCNC: 23 MG/DL
BUN SERPL-MCNC: 24 MG/DL
BUN SERPL-MCNC: 24 MG/DL
BZE UR QL SCN: NEGATIVE
CALCIUM SERPL-MCNC: 8.8 MG/DL
CALCIUM SERPL-MCNC: 8.8 MG/DL
CALCIUM SERPL-MCNC: 9.1 MG/DL
CANNABINOIDS UR QL SCN: NEGATIVE
CHLORIDE SERPL-SCNC: 101 MMOL/L
CHLORIDE SERPL-SCNC: 102 MMOL/L
CHLORIDE SERPL-SCNC: 99 MMOL/L
CHOLEST SERPL-MCNC: 161 MG/DL
CHOLEST/HDLC SERPL: 5.2 {RATIO}
CK SERPL-CCNC: 1662 U/L
CK SERPL-CCNC: 2078 U/L
CO2 SERPL-SCNC: 13 MMOL/L
CREAT SERPL-MCNC: 1.1 MG/DL
CREAT UR-MCNC: 58.3 MG/DL
DIFFERENTIAL METHOD: ABNORMAL
EOSINOPHIL # BLD AUTO: 0 K/UL
EOSINOPHIL NFR BLD: 0.1 %
ERYTHROCYTE [DISTWIDTH] IN BLOOD BY AUTOMATED COUNT: 13.2 %
EST. GFR  (AFRICAN AMERICAN): >60 ML/MIN/1.73 M^2
EST. GFR  (NON AFRICAN AMERICAN): 60 ML/MIN/1.73 M^2
ESTIMATED AVG GLUCOSE: 111 MG/DL
GGT SERPL-CCNC: 118 U/L
GLUCOSE SERPL-MCNC: 101 MG/DL
GLUCOSE SERPL-MCNC: 136 MG/DL
GLUCOSE SERPL-MCNC: 99 MG/DL
HAV IGM SERPL QL IA: NEGATIVE
HBA1C MFR BLD HPLC: 5.5 %
HBV CORE IGM SERPL QL IA: NEGATIVE
HBV SURFACE AG SERPL QL IA: NEGATIVE
HCO3 UR-SCNC: 16.7 MMOL/L (ref 24–28)
HCT VFR BLD AUTO: 35.3 %
HCV AB SERPL QL IA: NEGATIVE
HDLC SERPL-MCNC: 31 MG/DL
HDLC SERPL: 19.3 %
HGB BLD-MCNC: 12.4 G/DL
HIV 1+2 AB+HIV1 P24 AG SERPL QL IA: NEGATIVE
LDLC SERPL CALC-MCNC: 106.8 MG/DL
LIPASE SERPL-CCNC: 124 U/L
LYMPHOCYTES # BLD AUTO: 1 K/UL
LYMPHOCYTES NFR BLD: 7.1 %
MAGNESIUM SERPL-MCNC: 1.9 MG/DL
MCH RBC QN AUTO: 29.9 PG
MCHC RBC AUTO-ENTMCNC: 35.1 G/DL
MCV RBC AUTO: 85 FL
METHADONE UR QL SCN>300 NG/ML: NEGATIVE
MONOCYTES # BLD AUTO: 1.8 K/UL
MONOCYTES NFR BLD: 13.4 %
NEUTROPHILS # BLD AUTO: 10.7 K/UL
NEUTROPHILS NFR BLD: 79.4 %
NONHDLC SERPL-MCNC: 130 MG/DL
OPIATES UR QL SCN: NORMAL
PCO2 BLDA: 22.6 MMHG (ref 35–45)
PCP UR QL SCN>25 NG/ML: NEGATIVE
PH SMN: 7.48 [PH] (ref 7.35–7.45)
PHOSPHATE SERPL-MCNC: 1.9 MG/DL
PLATELET # BLD AUTO: 310 K/UL
PMV BLD AUTO: 11.2 FL
PO2 BLDA: 59 MMHG (ref 40–60)
POC BE: -7 MMOL/L
POC SATURATED O2: 93 % (ref 95–100)
POTASSIUM SERPL-SCNC: 3.1 MMOL/L
POTASSIUM SERPL-SCNC: 3.2 MMOL/L
POTASSIUM SERPL-SCNC: 3.3 MMOL/L
PROT SERPL-MCNC: 6.8 G/DL
RBC # BLD AUTO: 4.15 M/UL
SAMPLE: ABNORMAL
SITE: ABNORMAL
SODIUM SERPL-SCNC: 126 MMOL/L
SODIUM SERPL-SCNC: 129 MMOL/L
SODIUM SERPL-SCNC: 129 MMOL/L
TOXICOLOGY INFORMATION: NORMAL
TRIGL SERPL-MCNC: 105 MG/DL
TRIGL SERPL-MCNC: 116 MG/DL
WBC # BLD AUTO: 13.53 K/UL

## 2018-11-27 PROCEDURE — 11000001 HC ACUTE MED/SURG PRIVATE ROOM

## 2018-11-27 PROCEDURE — 84100 ASSAY OF PHOSPHORUS: CPT

## 2018-11-27 PROCEDURE — 80053 COMPREHEN METABOLIC PANEL: CPT

## 2018-11-27 PROCEDURE — 80048 BASIC METABOLIC PNL TOTAL CA: CPT | Mod: 91

## 2018-11-27 PROCEDURE — C9113 INJ PANTOPRAZOLE SODIUM, VIA: HCPCS | Performed by: NURSE PRACTITIONER

## 2018-11-27 PROCEDURE — 96372 THER/PROPH/DIAG INJ SC/IM: CPT

## 2018-11-27 PROCEDURE — 83735 ASSAY OF MAGNESIUM: CPT

## 2018-11-27 PROCEDURE — 82550 ASSAY OF CK (CPK): CPT | Mod: 91

## 2018-11-27 PROCEDURE — 63600175 PHARM REV CODE 636 W HCPCS: Performed by: HOSPITALIST

## 2018-11-27 PROCEDURE — 85025 COMPLETE CBC W/AUTO DIFF WBC: CPT

## 2018-11-27 PROCEDURE — 63600175 PHARM REV CODE 636 W HCPCS: Performed by: NURSE PRACTITIONER

## 2018-11-27 PROCEDURE — 84478 ASSAY OF TRIGLYCERIDES: CPT

## 2018-11-27 PROCEDURE — 36415 COLL VENOUS BLD VENIPUNCTURE: CPT

## 2018-11-27 PROCEDURE — 80048 BASIC METABOLIC PNL TOTAL CA: CPT

## 2018-11-27 PROCEDURE — 83690 ASSAY OF LIPASE: CPT

## 2018-11-27 PROCEDURE — 25000003 PHARM REV CODE 250: Performed by: INTERNAL MEDICINE

## 2018-11-27 PROCEDURE — 82550 ASSAY OF CK (CPK): CPT

## 2018-11-27 PROCEDURE — 21400001 HC TELEMETRY ROOM

## 2018-11-27 PROCEDURE — 80307 DRUG TEST PRSMV CHEM ANLYZR: CPT

## 2018-11-27 PROCEDURE — 25000003 PHARM REV CODE 250: Performed by: NURSE PRACTITIONER

## 2018-11-27 RX ORDER — POTASSIUM CHLORIDE 20 MEQ/1
40 TABLET, EXTENDED RELEASE ORAL EVERY 4 HOURS
Status: COMPLETED | OUTPATIENT
Start: 2018-11-27 | End: 2018-11-27

## 2018-11-27 RX ORDER — MEPERIDINE HYDROCHLORIDE 25 MG/ML
12.5 INJECTION INTRAMUSCULAR; INTRAVENOUS; SUBCUTANEOUS EVERY 6 HOURS PRN
Status: ACTIVE | OUTPATIENT
Start: 2018-11-27 | End: 2018-11-28

## 2018-11-27 RX ORDER — POTASSIUM CHLORIDE 20 MEQ/15ML
40 SOLUTION ORAL EVERY 4 HOURS
Status: ACTIVE | OUTPATIENT
Start: 2018-11-27 | End: 2018-11-27

## 2018-11-27 RX ORDER — SODIUM CHLORIDE 9 MG/ML
INJECTION, SOLUTION INTRAVENOUS CONTINUOUS
Status: DISCONTINUED | OUTPATIENT
Start: 2018-11-27 | End: 2018-11-27

## 2018-11-27 RX ORDER — DEXTROSE MONOHYDRATE AND SODIUM CHLORIDE 5; .45 G/100ML; G/100ML
INJECTION, SOLUTION INTRAVENOUS CONTINUOUS
Status: DISCONTINUED | OUTPATIENT
Start: 2018-11-27 | End: 2018-11-27

## 2018-11-27 RX ORDER — KETOROLAC TROMETHAMINE 30 MG/ML
30 INJECTION, SOLUTION INTRAMUSCULAR; INTRAVENOUS ONCE
Status: COMPLETED | OUTPATIENT
Start: 2018-11-27 | End: 2018-11-27

## 2018-11-27 RX ORDER — SODIUM CHLORIDE AND POTASSIUM CHLORIDE 150; 900 MG/100ML; MG/100ML
INJECTION, SOLUTION INTRAVENOUS CONTINUOUS
Status: DISCONTINUED | OUTPATIENT
Start: 2018-11-27 | End: 2018-11-29 | Stop reason: HOSPADM

## 2018-11-27 RX ORDER — CLONIDINE HYDROCHLORIDE 0.2 MG/1
0.2 TABLET ORAL ONCE
Status: COMPLETED | OUTPATIENT
Start: 2018-11-27 | End: 2018-11-27

## 2018-11-27 RX ORDER — MEPERIDINE HYDROCHLORIDE 25 MG/ML
25 INJECTION INTRAMUSCULAR; INTRAVENOUS; SUBCUTANEOUS EVERY 6 HOURS PRN
Status: ACTIVE | OUTPATIENT
Start: 2018-11-27 | End: 2018-11-28

## 2018-11-27 RX ORDER — PANTOPRAZOLE SODIUM 40 MG/10ML
40 INJECTION, POWDER, LYOPHILIZED, FOR SOLUTION INTRAVENOUS DAILY
Status: DISCONTINUED | OUTPATIENT
Start: 2018-11-27 | End: 2018-11-28

## 2018-11-27 RX ADMIN — POTASSIUM CHLORIDE 40 MEQ: 1500 TABLET, EXTENDED RELEASE ORAL at 02:11

## 2018-11-27 RX ADMIN — ENOXAPARIN SODIUM 40 MG: 100 INJECTION SUBCUTANEOUS at 05:11

## 2018-11-27 RX ADMIN — SODIUM CHLORIDE AND POTASSIUM CHLORIDE: 9; 1.49 INJECTION, SOLUTION INTRAVENOUS at 07:11

## 2018-11-27 RX ADMIN — ONDANSETRON 4 MG: 2 INJECTION INTRAMUSCULAR; INTRAVENOUS at 05:11

## 2018-11-27 RX ADMIN — CEFTRIAXONE 1 G: 1 INJECTION, SOLUTION INTRAVENOUS at 10:11

## 2018-11-27 RX ADMIN — PANTOPRAZOLE SODIUM 40 MG: 40 INJECTION, POWDER, FOR SOLUTION INTRAVENOUS at 09:11

## 2018-11-27 RX ADMIN — SODIUM CHLORIDE AND POTASSIUM CHLORIDE: 9; 1.49 INJECTION, SOLUTION INTRAVENOUS at 05:11

## 2018-11-27 RX ADMIN — RAMELTEON 8 MG: 8 TABLET, FILM COATED ORAL at 09:11

## 2018-11-27 RX ADMIN — KETOROLAC TROMETHAMINE 30 MG: 30 INJECTION, SOLUTION INTRAMUSCULAR at 01:11

## 2018-11-27 RX ADMIN — POTASSIUM, SODIUM PHOSPHATES 280 MG-160 MG-250 MG ORAL POWDER PACKET 1 PACKET: POWDER IN PACKET at 11:11

## 2018-11-27 RX ADMIN — POTASSIUM CHLORIDE 40 MEQ: 1500 TABLET, EXTENDED RELEASE ORAL at 10:11

## 2018-11-27 RX ADMIN — SODIUM CHLORIDE 1000 ML: 0.9 INJECTION, SOLUTION INTRAVENOUS at 03:11

## 2018-11-27 RX ADMIN — POTASSIUM, SODIUM PHOSPHATES 280 MG-160 MG-250 MG ORAL POWDER PACKET 1 PACKET: POWDER IN PACKET at 09:11

## 2018-11-27 RX ADMIN — AMLODIPINE BESYLATE 10 MG: 10 TABLET ORAL at 10:11

## 2018-11-27 RX ADMIN — POTASSIUM, SODIUM PHOSPHATES 280 MG-160 MG-250 MG ORAL POWDER PACKET 1 PACKET: POWDER IN PACKET at 05:11

## 2018-11-27 RX ADMIN — CLONIDINE HYDROCHLORIDE 0.2 MG: 0.2 TABLET ORAL at 03:11
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
## 2018-11-27 NOTE — ASSESSMENT & PLAN NOTE
- BP uncontrolled in ED.  Previously on clonidine patch, has not taken in several years.  - Will start amlodipine 10mg daily.
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
## 2018-11-27 NOTE — ASSESSMENT & PLAN NOTE
- Likely secondary to above + IVVD.  - Continue IV hydration.  - Avoid nephrotoxic agents.  - Follow BMP.
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
## 2018-11-27 NOTE — ASSESSMENT & PLAN NOTE
- Initial ,   - Trending down  - Acute Hepatitis panel negative  - Hold Hepatotoxic agents  - Daily CMP  - Outpatient f/u with GI pending clinical course

## 2018-11-27 NOTE — ASSESSMENT & PLAN NOTE
- CT ABD/Pelvis showed moderate inflammatory changes at the 2nd portion of duodenum characteristic of duodenitis.  - Supportive care with bowel rest, IVFs, antiemetics and analgesics PRN.   - Will start PPI.  - Ambulatory referral to GI at [facility].
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
## 2018-11-27 NOTE — ASSESSMENT & PLAN NOTE
- Last hospitalized at [facility] in 2012 at which time she was discharged to [facility].  - UDS positive for opiates  - SW following, but patient denies the need for resources at present time
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
## 2018-11-27 NOTE — ASSESSMENT & PLAN NOTE
- CT ABD/Pelvis showed moderate inflammatory changes at the 2nd portion of duodenum characteristic of duodenitis  - Supportive care with IVFs, antiemetics and analgesics PRN  - CL diet today and advance as tolerated  - Continue PPI  - Ambulatory referral to GI at [facility]
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
## 2018-11-27 NOTE — ASSESSMENT & PLAN NOTE
- Initial CPK 2637, , , cr. 1.5, BUN 38.  - IV fluid resuscitation given in ED.  Continue IV hydration, strict I&O's.  - Follow serial labs.  Replete electrolytes as needed.  - Avoid hepatotoxic agents.

## 2018-11-27 NOTE — PLAN OF CARE
11/27/18 1124   Discharge Assessment   Assessment Type Discharge Planning Assessment   Confirmed/corrected address and phone number on facesheet? Yes   Assessment information obtained from? Patient   Prior to hospitilization cognitive status: Alert/Oriented   Prior to hospitalization functional status: Independent   Current cognitive status: Alert/Oriented   Current Functional Status: Independent   Lives With child(darien), adult;child(darien), dependent;spouse   Able to Return to Prior Arrangements yes   Is patient able to care for self after discharge? Yes   Who are your caregiver(s) and their phone number(s)? Jhony Naranjo, spouse [phone]   Readmission Within The Last 30 Days no previous admission in last 30 days   Patient currently being followed by outpatient case management? No   Patient currently receives any other outside agency services? No   Equipment Currently Used at Home none   Do you have any problems affording any of your prescribed medications? No   Does the patient have transportation home? Yes   Transportation Available family or friend will provide   Does the patient receive services at the Coumadin Clinic? No   Discharge Plan A Home with family   Patient/Family In Agreement With Plan yes

## 2018-11-27 NOTE — ASSESSMENT & PLAN NOTE
- Likely secondary to persistent vomiting  - Continue normal saline IVFs for gradual Na repletion  - Will replete K to keep >/= 4  - Check Mg and phos, replete as needed  - Follow serial labs

## 2018-11-27 NOTE — ASSESSMENT & PLAN NOTE
- Admitted to Med-surg  - Initial CPK 2637, , , cr. 1.5, BUN 38  - CPK has trended down to 1600  - IV fluid resuscitation given in ED  - Continue IV hydration, strict I&O's  - Follow serial labs.  Replete electrolytes as needed  - Avoid hepatotoxic agents

## 2018-11-27 NOTE — PLAN OF CARE
Problem: Patient Care Overview  Goal: Plan of Care Review  Outcome: Ongoing (interventions implemented as appropriate)  POC reviewed, including indications and possible side effects of administered medications. Patient verbalized understanding and teach back. No adverse reactions noted. Patient c/o abdominal pain and nausea. Administered medications and IVF per order. NSR/ST on heart monitor. Patient remains free of falls and injuries during shift. Will continue to monitor.     Chart check complete.

## 2018-11-27 NOTE — ASSESSMENT & PLAN NOTE
- Likely secondary to above and now resolved  - Supportive care with  IVF's, and antipyretics PRN  - Monitor
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
## 2018-11-27 NOTE — ASSESSMENT & PLAN NOTE
- Likely secondary to above + IVVD  - Continue IV hydration.  - Avoid nephrotoxic agents.  - Follow BMP.

## 2018-11-27 NOTE — SUBJECTIVE & OBJECTIVE
Past Medical History:   Diagnosis Date    History of opioid and benzo abuse           Hypertension  Personality disorder  Depression  Anxiety        Past Surgical History:   Procedure Laterality Date     SECTION         Review of patient's allergies indicates:  No Known Allergies    No current facility-administered medications on file prior to encounter.      Current Outpatient Medications on File Prior to Encounter   Medication Sig    ibuprofen (ADVIL,MOTRIN) 200 MG tablet Take 200 mg by mouth every 6 (six) hours as needed for Pain.     Family History     Problem Relation (Age of Onset)    Muscular dystrophy, Suicide  Alcoholism Paternal Grandmother  Father        Tobacco Use    Smoking status: Never Smoker    Smokeless tobacco: Never Used   Substance and Sexual Activity    Alcohol use: Yes     Comment: once every 3 months     Drug use: No    Sexual activity: Yes     Partners: Male     Review of Systems   Constitutional: Negative for appetite change, chills, diaphoresis, fatigue, fever and unexpected weight change.   HENT: Negative for congestion, sore throat and trouble swallowing.    Eyes: Negative for visual disturbance.   Respiratory: Negative.  Negative for cough, shortness of breath and wheezing.    Cardiovascular: Negative for chest pain, palpitations and leg swelling.   Gastrointestinal: Positive for abdominal pain (epigastric), diarrhea, nausea and vomiting. Negative for abdominal distention, anal bleeding, blood in stool, constipation and rectal pain.   Endocrine: Negative for polydipsia, polyphagia and polyuria.   Genitourinary: Positive for dysuria. Negative for difficulty urinating, flank pain, frequency, hematuria and urgency.   Musculoskeletal: Negative for arthralgias, back pain and myalgias.   Skin: Negative for pallor, rash and wound.   Neurological: Negative.  Negative for dizziness, tremors, seizures, syncope, weakness, light-headedness, numbness and headaches.    Psychiatric/Behavioral: Positive for dysphoric mood. Negative for agitation, confusion, hallucinations, self-injury, sleep disturbance and suicidal ideas. The patient is not nervous/anxious.    All other systems reviewed and are negative.    Objective:     Vital Signs (Most Recent):  Temp: 98.6 °F (37 °C) (11/26/18 2034)  Pulse: 95 (11/26/18 2034)  Resp: 20 (11/26/18 2034)  BP: (!) 174/83 (11/26/18 2034)  SpO2: 100 % (11/26/18 2034) Vital Signs (24h Range):  Temp:  [98.4 °F (36.9 °C)-98.9 °F (37.2 °C)] 98.6 °F (37 °C)  Pulse:  [] 95  Resp:  [18-24] 20  SpO2:  [99 %-100 %] 100 %  BP: (174-228)/() 174/83     Weight: 109.9 kg (242 lb 4.6 oz)  Body mass index is 35.78 kg/m².    Physical Exam   Constitutional: She is oriented to person, place, and time. She appears well-developed and well-nourished. No distress.   HENT:   Head: Normocephalic and atraumatic.   Mouth/Throat: Mucous membranes are dry. Abnormal dentition.   Eyes: Conjunctivae are normal.   PERRL; EOM intact.   Neck: Normal range of motion. Neck supple. No JVD present.   Cardiovascular: Normal rate, regular rhythm, S1 normal, S2 normal and intact distal pulses.  No extrasystoles are present. Exam reveals no gallop and no friction rub.   No murmur heard.  Pulses:       Radial pulses are 2+ on the right side, and 2+ on the left side.        Dorsalis pedis pulses are 2+ on the right side, and 2+ on the left side.        Posterior tibial pulses are 2+ on the right side, and 2+ on the left side.   Pulmonary/Chest: Effort normal and breath sounds normal. No accessory muscle usage. No tachypnea. No respiratory distress. She has no wheezes. She has no rhonchi. She has no rales.   Abdominal: Soft. Bowel sounds are normal. She exhibits distension (mild). She exhibits no abdominal bruit and no ascites. There is no hepatosplenomegaly. There is tenderness (mild TTP) in the epigastric area. There is no rigidity, no rebound, no guarding, no CVA tenderness and  negative Johnson's sign.   Musculoskeletal: Normal range of motion. She exhibits no edema, tenderness or deformity.   Neurological: She is alert and oriented to person, place, and time. She has normal strength. No cranial nerve deficit or sensory deficit. GCS eye subscore is 4. GCS verbal subscore is 5. GCS motor subscore is 6.   Skin: Skin is warm, dry and intact. Capillary refill takes less than 2 seconds. Bruising noted. No rash noted. She is not diaphoretic. No cyanosis or erythema.   Multiple bruises noted to all extremities, anterior and posterior trunk.  Bruises in different stages of healing.   Psychiatric: Her speech is normal and behavior is normal. Her mood appears anxious. She is not actively hallucinating. Thought content is not paranoid and not delusional. Cognition and memory are normal. She does not express impulsivity or inappropriate judgment. She exhibits a depressed mood. She expresses no homicidal and no suicidal ideation. She expresses no suicidal plans and no homicidal plans. She is attentive.   Nursing note and vitals reviewed.          Significant Labs:   Results for orders placed or performed during the hospital encounter of 11/26/18   CBC W/ AUTO DIFFERENTIAL   Result Value Ref Range    WBC 18.86 (H) 3.90 - 12.70 K/uL    RBC 4.91 4.00 - 5.40 M/uL    Hemoglobin 14.5 12.0 - 16.0 g/dL    Hematocrit 39.4 37.0 - 48.5 %    MCV 80 (L) 82 - 98 fL    MCH 29.5 27.0 - 31.0 pg    MCHC 36.8 (H) 32.0 - 36.0 g/dL    RDW 13.2 11.5 - 14.5 %    Platelets 447 (H) 150 - 350 K/uL    MPV 10.8 9.2 - 12.9 fL    Gran # (ANC) 15.8 (H) 1.8 - 7.7 K/uL    Lymph # 0.6 (L) 1.0 - 4.8 K/uL    Mono # 2.3 (H) 0.3 - 1.0 K/uL    Eos # 0.0 0.0 - 0.5 K/uL    Baso # 0.02 0.00 - 0.20 K/uL    Gran% 84.4 (H) 38.0 - 73.0 %    Lymph% 3.2 (L) 18.0 - 48.0 %    Mono% 12.2 4.0 - 15.0 %    Eosinophil% 0.1 0.0 - 8.0 %    Basophil% 0.1 0.0 - 1.9 %    Differential Method Automated    Comp. Metabolic Panel   Result Value Ref Range    Sodium  126 (L) 136 - 145 mmol/L    Potassium 3.4 (L) 3.5 - 5.1 mmol/L    Chloride 96 95 - 110 mmol/L    CO2 14 (L) 23 - 29 mmol/L    Glucose 133 (H) 70 - 110 mg/dL    BUN, Bld 38 (H) 6 - 20 mg/dL    Creatinine 1.5 (H) 0.5 - 1.4 mg/dL    Calcium 9.4 8.7 - 10.5 mg/dL    Total Protein 8.4 6.0 - 8.4 g/dL    Albumin 4.1 3.5 - 5.2 g/dL    Total Bilirubin 0.8 0.1 - 1.0 mg/dL    Alkaline Phosphatase 151 (H) 55 - 135 U/L     (H) 10 - 40 U/L     (H) 10 - 44 U/L    Anion Gap 16 8 - 16 mmol/L    eGFR if African American 47.5 (A) >60 mL/min/1.73 m^2    eGFR if non  41.2 (A) >60 mL/min/1.73 m^2   Lipase   Result Value Ref Range    Lipase 112 (H) 4 - 60 U/L   Urinalysis, Reflex to Urine Culture Urine, Clean Catch   Result Value Ref Range    Specimen UA Urine, Clean Catch     Color, UA Yellow Yellow, Straw, Janice    Appearance, UA Clear Clear    pH, UA 6.0 5.0 - 8.0    Specific Gravity, UA 1.015 1.005 - 1.030    Protein, UA 1+ (A) Negative    Glucose, UA Negative Negative    Ketones, UA Trace (A) Negative    Bilirubin (UA) Negative Negative    Occult Blood UA 3+ (A) Negative    Nitrite, UA Positive (A) Negative    Urobilinogen, UA Negative <2.0 EU/dL    Leukocytes, UA Trace (A) Negative   Troponin I   Result Value Ref Range    Troponin I 0.012 0.000 - 0.026 ng/mL   Urinalysis Microscopic   Result Value Ref Range    RBC, UA 1 0 - 4 /hpf    WBC, UA 3 0 - 5 /hpf    Bacteria, UA Many (A) None-Occ /hpf    Squam Epithel, UA 2 /hpf    Hyaline Casts, UA 0 0-1/lpf /lpf    Microscopic Comment SEE COMMENT    Pregnancy, urine rapid   Result Value Ref Range    Preg Test, Ur Negative    Protime-INR   Result Value Ref Range    Prothrombin Time 13.2 (H) 9.0 - 12.5 sec    INR 1.2 0.8 - 1.2   CK   Result Value Ref Range    CPK 2637 (H) 20 - 180 U/L   Ethanol   Result Value Ref Range    Alcohol, Medical, Serum <10 <10 mg/dL   Amylase   Result Value Ref Range    Amylase 47 20 - 110 U/L   Acetaminophen level   Result Value Ref  Range    Acetaminophen (Tylenol), Serum <3.0 (L) 10.0 - 20.0 ug/mL   Basic metabolic panel   Result Value Ref Range    Sodium 130 (L) 136 - 145 mmol/L    Potassium 3.5 3.5 - 5.1 mmol/L    Chloride 98 95 - 110 mmol/L    CO2 17 (L) 23 - 29 mmol/L    Glucose 115 (H) 70 - 110 mg/dL    BUN, Bld 28 (H) 6 - 20 mg/dL    Creatinine 1.3 0.5 - 1.4 mg/dL    Calcium 9.5 8.7 - 10.5 mg/dL    Anion Gap 15 8 - 16 mmol/L    eGFR if African American 56 (A) >60 mL/min/1.73 m^2    eGFR if non African American 49 (A) >60 mL/min/1.73 m^2   Magnesium   Result Value Ref Range    Magnesium 2.1 1.6 - 2.6 mg/dL   Phosphorus   Result Value Ref Range    Phosphorus 1.8 (L) 2.7 - 4.5 mg/dL      All pertinent labs within the past 24 hours have been reviewed.    Significant Imaging:   Imaging Results          CT Abdomen Pelvis With Contrast (Final result)  Result time 11/26/18 13:46:43    Final result by RALPH Blanca Sr., MD (11/26/18 13:46:43)                 Impression:      1. There are moderate inflammatory changes between the head of the pancreas and the 2nd portion of the duodenum.  This is characteristic of pancreatitis and/or duodenitis.  2. The 2nd portion of the duodenum is dilated. It has a diameter of 3.5 cm.  Given the adjacent inflammatory changes this is characteristic of a focal ileus.  3. There are mild degenerative changes between L1 and L2. There is grade 1 anterolisthesis of L3 on L4.  All CT scans at this facility use dose modulation, iterative reconstruction, and/or weight base dosing when appropriate to reduce radiation dose when appropriate to reduce radiation dose to as low as reasonably achievable.      Electronically signed by: Bill Blanca MD  Date:    11/26/2018  Time:    13:46             Narrative:    EXAMINATION:  CT ABDOMEN PELVIS WITH CONTRAST    CLINICAL HISTORY:  Nausea, vomiting, diarrhea;    TECHNIQUE:  Standard abdomen and pelvis CT protocol with oral and IV contrast was performed.  75 mL of Omnipaque  350 contrast material was used for this examination.    FINDINGS:  Finding: The size of the heart is within normal limits. The lungs are clear. There is no pneumothorax or pleural effusion.    The liver, gallbladder, spleen, adrenals, and kidneys are normal in appearance. The ureters and the urinary bladder are normal in appearance.  The uterus and ovaries are normal in appearance.  The appendix is normal in appearance.  There are moderate inflammatory changes between the head of the pancreas and the 2nd portion of the duodenum.  The 2nd portion of the duodenum is dilated.  It has a diameter of 3.5 cm.  There is no free fluid within the abdomen or pelvis. There is no pneumoperitoneum.  There is a mild amount of dextroconvex curvature of the thoracolumbar spine.  There are mild degenerative changes between L1 and L2.  There is grade 1 anterolisthesis of L3 on L4.                               X-Ray Abdomen Flat And Erect (Final result)  Result time 11/26/18 09:39:30    Final result by Memo Zepeda MD (11/26/18 09:39:30)                 Impression:      No acute kinks.      Electronically signed by: Memo Zepeda MD  Date:    11/26/2018  Time:    09:39             Narrative:    EXAMINATION:  XR ABDOMEN FLAT AND ERECT    CLINICAL HISTORY:  Abdominal Pain;    COMPARISON:  None.    FINDINGS:  The bowel gas pattern is unremarkable. No obstruction, ileus or free air.    Thoracolumbar scoliosis and degenerative disc disease are incidentally noted.                               I have reviewed all pertinent imaging results/findings within the past 24 hours.

## 2018-11-27 NOTE — ASSESSMENT & PLAN NOTE
- BP uncontrolled in ED.  Previously on clonidine patch, has not taken in several years  - Started on Amlodipine 10mg daily  - Monitor and adjust meds as needed based on trends

## 2018-11-28 PROBLEM — R19.7 DIARRHEA: Status: ACTIVE | Noted: 2018-11-26

## 2018-11-28 PROBLEM — E87.1 HYPONATREMIA: Status: RESOLVED | Noted: 2018-11-27 | Resolved: 2018-11-28

## 2018-11-28 LAB
ALBUMIN SERPL BCP-MCNC: 2.9 G/DL
ALP SERPL-CCNC: 90 U/L
ALT SERPL W/O P-5'-P-CCNC: 357 U/L
ANION GAP SERPL CALC-SCNC: 10 MMOL/L
AST SERPL-CCNC: 126 U/L
BACTERIA UR CULT: NORMAL
BASOPHILS # BLD AUTO: 0.01 K/UL
BASOPHILS NFR BLD: 0.1 %
BILIRUB SERPL-MCNC: 0.3 MG/DL
BUN SERPL-MCNC: 7 MG/DL
C DIFF GDH STL QL: NEGATIVE
C DIFF TOX A+B STL QL IA: NEGATIVE
CALCIUM SERPL-MCNC: 8.4 MG/DL
CHLORIDE SERPL-SCNC: 109 MMOL/L
CK SERPL-CCNC: 801 U/L
CO2 SERPL-SCNC: 15 MMOL/L
CREAT SERPL-MCNC: 0.7 MG/DL
DIFFERENTIAL METHOD: ABNORMAL
EOSINOPHIL # BLD AUTO: 0.1 K/UL
EOSINOPHIL NFR BLD: 1.1 %
ERYTHROCYTE [DISTWIDTH] IN BLOOD BY AUTOMATED COUNT: 13.6 %
EST. GFR  (AFRICAN AMERICAN): >60 ML/MIN/1.73 M^2
EST. GFR  (NON AFRICAN AMERICAN): >60 ML/MIN/1.73 M^2
GLUCOSE SERPL-MCNC: 99 MG/DL
HCT VFR BLD AUTO: 33.8 %
HGB BLD-MCNC: 11.5 G/DL
LIPASE SERPL-CCNC: 187 U/L
LYMPHOCYTES # BLD AUTO: 1.4 K/UL
LYMPHOCYTES NFR BLD: 12.4 %
MAGNESIUM SERPL-MCNC: 1.5 MG/DL
MCH RBC QN AUTO: 29.7 PG
MCHC RBC AUTO-ENTMCNC: 34 G/DL
MCV RBC AUTO: 87 FL
MONOCYTES # BLD AUTO: 1.8 K/UL
MONOCYTES NFR BLD: 16.2 %
NEUTROPHILS # BLD AUTO: 8 K/UL
NEUTROPHILS NFR BLD: 70.2 %
PHOSPHATE SERPL-MCNC: 1.9 MG/DL
PLATELET # BLD AUTO: 221 K/UL
PMV BLD AUTO: 12.2 FL
POTASSIUM SERPL-SCNC: 4 MMOL/L
PROT SERPL-MCNC: 5.9 G/DL
RBC # BLD AUTO: 3.87 M/UL
SODIUM SERPL-SCNC: 134 MMOL/L
TSH SERPL DL<=0.005 MIU/L-ACNC: 0.44 UIU/ML
WBC # BLD AUTO: 11.36 K/UL

## 2018-11-28 PROCEDURE — 82550 ASSAY OF CK (CPK): CPT

## 2018-11-28 PROCEDURE — 25000003 PHARM REV CODE 250: Performed by: NURSE PRACTITIONER

## 2018-11-28 PROCEDURE — 87045 FECES CULTURE AEROBIC BACT: CPT

## 2018-11-28 PROCEDURE — 85025 COMPLETE CBC W/AUTO DIFF WBC: CPT

## 2018-11-28 PROCEDURE — 83690 ASSAY OF LIPASE: CPT

## 2018-11-28 PROCEDURE — 80053 COMPREHEN METABOLIC PANEL: CPT

## 2018-11-28 PROCEDURE — 84443 ASSAY THYROID STIM HORMONE: CPT

## 2018-11-28 PROCEDURE — 87324 CLOSTRIDIUM AG IA: CPT

## 2018-11-28 PROCEDURE — 36415 COLL VENOUS BLD VENIPUNCTURE: CPT

## 2018-11-28 PROCEDURE — 21400001 HC TELEMETRY ROOM

## 2018-11-28 PROCEDURE — 87046 STOOL CULTR AEROBIC BACT EA: CPT | Mod: 59

## 2018-11-28 PROCEDURE — 83735 ASSAY OF MAGNESIUM: CPT

## 2018-11-28 PROCEDURE — 63600175 PHARM REV CODE 636 W HCPCS: Performed by: NURSE PRACTITIONER

## 2018-11-28 PROCEDURE — 25000003 PHARM REV CODE 250: Performed by: INTERNAL MEDICINE

## 2018-11-28 PROCEDURE — 87209 SMEAR COMPLEX STAIN: CPT

## 2018-11-28 PROCEDURE — 11000001 HC ACUTE MED/SURG PRIVATE ROOM

## 2018-11-28 PROCEDURE — 63600175 PHARM REV CODE 636 W HCPCS: Performed by: HOSPITALIST

## 2018-11-28 PROCEDURE — 84100 ASSAY OF PHOSPHORUS: CPT

## 2018-11-28 PROCEDURE — 87427 SHIGA-LIKE TOXIN AG IA: CPT | Mod: 59

## 2018-11-28 RX ORDER — PROMETHAZINE HYDROCHLORIDE 25 MG/1
25 TABLET ORAL EVERY 6 HOURS PRN
Status: DISCONTINUED | OUTPATIENT
Start: 2018-11-28 | End: 2018-11-29 | Stop reason: HOSPADM

## 2018-11-28 RX ORDER — LOPERAMIDE HYDROCHLORIDE 2 MG/1
2 CAPSULE ORAL 4 TIMES DAILY PRN
Status: DISCONTINUED | OUTPATIENT
Start: 2018-11-28 | End: 2018-11-29 | Stop reason: HOSPADM

## 2018-11-28 RX ORDER — LANOLIN ALCOHOL/MO/W.PET/CERES
400 CREAM (GRAM) TOPICAL 2 TIMES DAILY
Status: DISCONTINUED | OUTPATIENT
Start: 2018-11-28 | End: 2018-11-29 | Stop reason: HOSPADM

## 2018-11-28 RX ORDER — ENOXAPARIN SODIUM 100 MG/ML
40 INJECTION SUBCUTANEOUS EVERY 24 HOURS
Status: DISCONTINUED | OUTPATIENT
Start: 2018-11-28 | End: 2018-11-28

## 2018-11-28 RX ORDER — DICYCLOMINE HYDROCHLORIDE 10 MG/1
10 CAPSULE ORAL 4 TIMES DAILY
Status: DISCONTINUED | OUTPATIENT
Start: 2018-11-28 | End: 2018-11-29 | Stop reason: HOSPADM

## 2018-11-28 RX ORDER — HYDROCORTISONE 1 %
CREAM (GRAM) TOPICAL 4 TIMES DAILY
Status: DISCONTINUED | OUTPATIENT
Start: 2018-11-28 | End: 2018-11-29 | Stop reason: HOSPADM

## 2018-11-28 RX ORDER — LISINOPRIL 5 MG/1
5 TABLET ORAL DAILY
Status: DISCONTINUED | OUTPATIENT
Start: 2018-11-28 | End: 2018-11-29 | Stop reason: HOSPADM

## 2018-11-28 RX ORDER — PANTOPRAZOLE SODIUM 40 MG/1
40 TABLET, DELAYED RELEASE ORAL DAILY
Status: DISCONTINUED | OUTPATIENT
Start: 2018-11-28 | End: 2018-11-29 | Stop reason: HOSPADM

## 2018-11-28 RX ORDER — SODIUM CHLORIDE 9 MG/ML
INJECTION, SOLUTION INTRAVENOUS CONTINUOUS
Status: DISCONTINUED | OUTPATIENT
Start: 2018-11-28 | End: 2018-11-29 | Stop reason: HOSPADM

## 2018-11-28 RX ORDER — ONDANSETRON 2 MG/ML
4 INJECTION INTRAMUSCULAR; INTRAVENOUS EVERY 8 HOURS PRN
Status: DISCONTINUED | OUTPATIENT
Start: 2018-11-28 | End: 2018-11-29 | Stop reason: HOSPADM

## 2018-11-28 RX ORDER — CHOLESTYRAMINE 4 G/9G
1 POWDER, FOR SUSPENSION ORAL 2 TIMES DAILY
Status: DISCONTINUED | OUTPATIENT
Start: 2018-11-28 | End: 2018-11-29 | Stop reason: HOSPADM

## 2018-11-28 RX ADMIN — POTASSIUM, SODIUM PHOSPHATES 280 MG-160 MG-250 MG ORAL POWDER PACKET 1 PACKET: POWDER IN PACKET at 05:11

## 2018-11-28 RX ADMIN — SODIUM CHLORIDE AND POTASSIUM CHLORIDE: 9; 1.49 INJECTION, SOLUTION INTRAVENOUS at 11:11

## 2018-11-28 RX ADMIN — ENOXAPARIN SODIUM 40 MG: 100 INJECTION SUBCUTANEOUS at 05:11

## 2018-11-28 RX ADMIN — DICYCLOMINE HYDROCHLORIDE 10 MG: 10 CAPSULE ORAL at 08:11

## 2018-11-28 RX ADMIN — RAMELTEON 8 MG: 8 TABLET, FILM COATED ORAL at 08:11

## 2018-11-28 RX ADMIN — LISINOPRIL 5 MG: 5 TABLET ORAL at 01:11

## 2018-11-28 RX ADMIN — POTASSIUM, SODIUM PHOSPHATES 280 MG-160 MG-250 MG ORAL POWDER PACKET 1 PACKET: POWDER IN PACKET at 08:11

## 2018-11-28 RX ADMIN — DICYCLOMINE HYDROCHLORIDE 10 MG: 10 CAPSULE ORAL at 05:11

## 2018-11-28 RX ADMIN — HYDROCORTISONE: 1 CREAM TOPICAL at 08:11

## 2018-11-28 RX ADMIN — AMLODIPINE BESYLATE 10 MG: 10 TABLET ORAL at 08:11

## 2018-11-28 RX ADMIN — HYDROCORTISONE: 1 CREAM TOPICAL at 05:11

## 2018-11-28 RX ADMIN — MAGNESIUM OXIDE TAB 400 MG (241.3 MG ELEMENTAL MG) 400 MG: 400 (241.3 MG) TAB at 09:11

## 2018-11-28 RX ADMIN — MAGNESIUM OXIDE TAB 400 MG (241.3 MG ELEMENTAL MG) 400 MG: 400 (241.3 MG) TAB at 08:11

## 2018-11-28 RX ADMIN — CHOLESTYRAMINE 4 G: 4 POWDER, FOR SUSPENSION ORAL at 05:11

## 2018-11-28 RX ADMIN — CEFTRIAXONE 1 G: 1 INJECTION, SOLUTION INTRAVENOUS at 09:11

## 2018-11-28 RX ADMIN — DICYCLOMINE HYDROCHLORIDE 10 MG: 10 CAPSULE ORAL at 01:11

## 2018-11-28 RX ADMIN — SODIUM CHLORIDE AND POTASSIUM CHLORIDE: 9; 1.49 INJECTION, SOLUTION INTRAVENOUS at 08:11

## 2018-11-28 RX ADMIN — SODIUM CHLORIDE AND POTASSIUM CHLORIDE: 9; 1.49 INJECTION, SOLUTION INTRAVENOUS at 01:11

## 2018-11-28 RX ADMIN — POTASSIUM, SODIUM PHOSPHATES 280 MG-160 MG-250 MG ORAL POWDER PACKET 1 PACKET: POWDER IN PACKET at 07:11

## 2018-11-28 RX ADMIN — PANTOPRAZOLE SODIUM 40 MG: 40 TABLET, DELAYED RELEASE ORAL at 08:11

## 2018-11-28 RX ADMIN — POTASSIUM, SODIUM PHOSPHATES 280 MG-160 MG-250 MG ORAL POWDER PACKET 1 PACKET: POWDER IN PACKET at 01:11

## 2018-11-28 NOTE — SUBJECTIVE & OBJECTIVE
"Interval History:  No acute events overnight.  Patient reports "feeling better", but has c/o "non-stop diarrhea".  Lipase trended up slightly, but she continues to deny abdominal pain, and N/V and is requesting to continue her regular diet.  Cdiff and stool cx pending.  UC shows gram negative rods with identification and susceptibility pending.  Continue Rocephin for now and change ABX based on final culture.  ALT/AST continues to improve.  CPK improved to 801.  Continue IVFs given diarrhea.  Anticipate DC home in AM pending clinical course.      Review of Systems   Constitutional: Positive for fatigue. Negative for activity change, appetite change, chills, diaphoresis, fever and unexpected weight change.   HENT: Negative for congestion, sore throat and trouble swallowing.    Eyes: Negative for visual disturbance.   Respiratory: Negative.  Negative for cough, shortness of breath and wheezing.    Cardiovascular: Negative for chest pain, palpitations and leg swelling.   Gastrointestinal: Negative for abdominal distention, abdominal pain, anal bleeding, blood in stool, constipation, diarrhea, nausea, rectal pain and vomiting.   Endocrine: Negative for polydipsia, polyphagia and polyuria.   Genitourinary: Positive for dysuria. Negative for difficulty urinating, flank pain, frequency, hematuria and urgency.   Musculoskeletal: Negative for arthralgias, back pain and myalgias.   Skin: Positive for color change. Negative for pallor, rash and wound.        Multiple bruises to all extremities and trunk   Neurological: Positive for weakness. Negative for dizziness, tremors, seizures, syncope, light-headedness, numbness and headaches.   Psychiatric/Behavioral: Positive for dysphoric mood. Negative for agitation, confusion, hallucinations, self-injury, sleep disturbance and suicidal ideas. The patient is nervous/anxious.    All other systems reviewed and are negative.    Objective:     Vital Signs (Most Recent):  Temp: 98.6 °F " (37 °C) (11/28/18 0849)  Pulse: 82 (11/28/18 0849)  Resp: 18 (11/28/18 0849)  BP: (!) 169/83 (11/28/18 0849)  SpO2: 100 % (11/28/18 0849) Vital Signs (24h Range):  Temp:  [97.5 °F (36.4 °C)-99.4 °F (37.4 °C)] 98.6 °F (37 °C)  Pulse:  [75-90] 82  Resp:  [16-18] 18  SpO2:  [98 %-100 %] 100 %  BP: (135-169)/(63-83) 169/83     Weight: 109.9 kg (242 lb 4.6 oz)  Body mass index is 35.78 kg/m².    Intake/Output Summary (Last 24 hours) at 11/28/2018 1233  Last data filed at 11/28/2018 0400  Gross per 24 hour   Intake 2937.08 ml   Output 3 ml   Net 2934.08 ml      Physical Exam   Constitutional: She is oriented to person, place, and time. She appears well-developed and well-nourished. She appears distressed.   Unkept; foul body odor   HENT:   Head: Normocephalic and atraumatic.   Mouth/Throat: Mucous membranes are dry. Abnormal dentition.   Eyes: Conjunctivae and EOM are normal.   PERRL; EOM intact.   Neck: Normal range of motion. Neck supple. No JVD present.   Cardiovascular: Normal rate, regular rhythm, S1 normal, S2 normal and intact distal pulses.  No extrasystoles are present. Exam reveals no gallop and no friction rub.   No murmur heard.  Pulses:       Radial pulses are 2+ on the right side, and 2+ on the left side.        Dorsalis pedis pulses are 2+ on the right side, and 2+ on the left side.        Posterior tibial pulses are 2+ on the right side, and 2+ on the left side.   Pulmonary/Chest: Effort normal and breath sounds normal. No accessory muscle usage. No tachypnea. No respiratory distress. She has no wheezes. She has no rhonchi. She has no rales.   Abdominal: Soft. Bowel sounds are normal. She exhibits no distension, no abdominal bruit and no ascites. There is no hepatosplenomegaly. There is no tenderness. There is no rigidity, no rebound, no guarding, no CVA tenderness and negative Johnson's sign.   Musculoskeletal: Normal range of motion. She exhibits no edema, tenderness or deformity.   Neurological: She is  alert and oriented to person, place, and time. She has normal strength. No cranial nerve deficit or sensory deficit. GCS eye subscore is 4. GCS verbal subscore is 5. GCS motor subscore is 6.   Skin: Skin is warm, dry and intact. Capillary refill takes less than 2 seconds. Bruising noted. No rash noted. She is not diaphoretic. No cyanosis or erythema.   Multiple bruises noted to all extremities, anterior and posterior trunk.  Bruises in different stages of healing.   Psychiatric: Thought content normal. Her mood appears anxious. Her speech is rapid and/or pressured. She is agitated and withdrawn. She is not actively hallucinating. Thought content is not paranoid and not delusional. Cognition and memory are normal. She does not express impulsivity or inappropriate judgment. She exhibits a depressed mood. She expresses no homicidal and no suicidal ideation. She expresses no suicidal plans and no homicidal plans. She is attentive.   Nursing note and vitals reviewed.      Significant Labs:   CBC:   Recent Labs   Lab 11/27/18 0448 11/28/18  0432   WBC 13.53* 11.36   HGB 12.4 11.5*   HCT 35.3* 33.8*    221     CMP:   Recent Labs   Lab 11/27/18 0443 11/27/18 0448 11/28/18  0432   * 129* 134*   K 3.2* 3.1* 4.0    101 109   CO2 13* 13* 15*   GLU 99 101 99   BUN 24* 23* 7   CREATININE 1.1 1.1 0.7   CALCIUM 9.1 8.8 8.4*   PROT 6.8  --  5.9*   ALBUMIN 3.4*  --  2.9*   BILITOT 0.8  --  0.3   ALKPHOS 121  --  90   *  --  126*   *  --  357*   ANIONGAP 14 15 10   EGFRNONAA 60 60 >60     TSH:   Recent Labs   Lab 11/28/18  0432   TSH 0.436       Significant Imaging: I have reviewed all pertinent imaging results/findings within the past 24 hours.

## 2018-11-28 NOTE — PLAN OF CARE
Problem: Patient Care Overview  Goal: Plan of Care Review  Outcome: Ongoing (interventions implemented as appropriate)  Fall precautions maintained. Pt free from falls/injuries.  Patient denies any complaints of pain at this time.   Antibiotics given as prescribed.  Ambulates and repositions independently.   Plan of care and medications discussed with patient.  Patient verbalized understanding.  Bed locked and low, call bell within reach.  Chart check done. Will continue to monitor.
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
## 2018-11-28 NOTE — ASSESSMENT & PLAN NOTE
- Admitted to Med-surg  - Initial CPK 2637, , , cr. 1.5, BUN 38  - CPK trending down  - LFTs improving  - IV fluid resuscitation given in ED  - Continue IV hydration, strict I&O's  - Follow serial labs.  Replete electrolytes as needed  - Avoid hepatotoxic agents

## 2018-11-28 NOTE — PLAN OF CARE
Problem: Patient Care Overview  Goal: Plan of Care Review  Outcome: Ongoing (interventions implemented as appropriate)  Fall precautions maintained. Pt free from falls/injuries. Pt repositions and ambulates independently. Pt has no c/o pain. Full liquid diet maintained and tolerated. Fluid promotion with PO fluids and IV fluids. Patient turns every 2 hours. POC and meds reviewed. Patient verbalized understanding. Side rails up x2. Bed Low and locked. Personal items and call light within reach. No signs/symptoms of acute distress. Chart check done. Will monitor

## 2018-11-28 NOTE — ASSESSMENT & PLAN NOTE
- CT ABD/Pelvis showed moderate inflammatory changes at the 2nd portion of duodenum characteristic of duodenitis  - Supportive care with IVFs, antiemetics and analgesics PRN  - Insists on eating a regular diet  - Continue PPI  - Ambulatory referral to GI at [place]

## 2018-11-28 NOTE — ASSESSMENT & PLAN NOTE
- UA consistent with UTI  - Urine culture shows gram negative rods with identification and susceptibility pending  - Continue Rocephin for now and change ABX based on final culture.

## 2018-11-28 NOTE — ASSESSMENT & PLAN NOTE
- Likely secondary to above   - Cdiff and stool cx pending  - Insists on eating regular diet  - Bentyl QID for spasms  - Supportive care with  IVFs  - Monitor

## 2018-11-28 NOTE — ASSESSMENT & PLAN NOTE
- BP uncontrolled in ED.  Previously on clonidine patch, has not taken in several years  - Started on Amlodipine 10mg daily with minimal improvement  - Add lose dose Lisinopril today  - Monitor and adjust meds as needed based on trends

## 2018-11-29 VITALS
RESPIRATION RATE: 18 BRPM | SYSTOLIC BLOOD PRESSURE: 145 MMHG | TEMPERATURE: 98 F | WEIGHT: 242.31 LBS | OXYGEN SATURATION: 98 % | HEIGHT: 69 IN | DIASTOLIC BLOOD PRESSURE: 82 MMHG | BODY MASS INDEX: 35.89 KG/M2 | HEART RATE: 80 BPM

## 2018-11-29 PROBLEM — F19.10 POLYSUBSTANCE ABUSE: Chronic | Status: RESOLVED | Noted: 2018-11-27 | Resolved: 2018-11-29

## 2018-11-29 PROBLEM — M62.82 RHABDOMYOLYSIS: Status: RESOLVED | Noted: 2018-11-27 | Resolved: 2018-11-29

## 2018-11-29 LAB
ALBUMIN SERPL BCP-MCNC: 2.7 G/DL
ALBUMIN SERPL BCP-MCNC: 2.7 G/DL
ALP SERPL-CCNC: 84 U/L
ALP SERPL-CCNC: 84 U/L
ALT SERPL W/O P-5'-P-CCNC: 242 U/L
ALT SERPL W/O P-5'-P-CCNC: 242 U/L
ANION GAP SERPL CALC-SCNC: 10 MMOL/L
ANION GAP SERPL CALC-SCNC: 10 MMOL/L
AST SERPL-CCNC: 70 U/L
AST SERPL-CCNC: 70 U/L
BASOPHILS # BLD AUTO: 0.01 K/UL
BASOPHILS # BLD AUTO: 0.01 K/UL
BASOPHILS NFR BLD: 0.1 %
BASOPHILS NFR BLD: 0.1 %
BILIRUB SERPL-MCNC: 0.2 MG/DL
BILIRUB SERPL-MCNC: 0.2 MG/DL
BUN SERPL-MCNC: 4 MG/DL
BUN SERPL-MCNC: 4 MG/DL
CALCIUM SERPL-MCNC: 8.3 MG/DL
CALCIUM SERPL-MCNC: 8.3 MG/DL
CHLORIDE SERPL-SCNC: 109 MMOL/L
CHLORIDE SERPL-SCNC: 109 MMOL/L
CK SERPL-CCNC: 535 U/L
CO2 SERPL-SCNC: 18 MMOL/L
CO2 SERPL-SCNC: 18 MMOL/L
CREAT SERPL-MCNC: 0.7 MG/DL
CREAT SERPL-MCNC: 0.7 MG/DL
DIFFERENTIAL METHOD: ABNORMAL
DIFFERENTIAL METHOD: ABNORMAL
EOSINOPHIL # BLD AUTO: 0.1 K/UL
EOSINOPHIL # BLD AUTO: 0.1 K/UL
EOSINOPHIL NFR BLD: 0.8 %
EOSINOPHIL NFR BLD: 0.8 %
ERYTHROCYTE [DISTWIDTH] IN BLOOD BY AUTOMATED COUNT: 14 %
ERYTHROCYTE [DISTWIDTH] IN BLOOD BY AUTOMATED COUNT: 14 %
EST. GFR  (AFRICAN AMERICAN): >60 ML/MIN/1.73 M^2
EST. GFR  (AFRICAN AMERICAN): >60 ML/MIN/1.73 M^2
EST. GFR  (NON AFRICAN AMERICAN): >60 ML/MIN/1.73 M^2
EST. GFR  (NON AFRICAN AMERICAN): >60 ML/MIN/1.73 M^2
GLUCOSE SERPL-MCNC: 129 MG/DL
GLUCOSE SERPL-MCNC: 129 MG/DL
HCT VFR BLD AUTO: 31.5 %
HCT VFR BLD AUTO: 31.5 %
HGB BLD-MCNC: 11 G/DL
HGB BLD-MCNC: 11 G/DL
LYMPHOCYTES # BLD AUTO: 1.6 K/UL
LYMPHOCYTES # BLD AUTO: 1.6 K/UL
LYMPHOCYTES NFR BLD: 16.3 %
LYMPHOCYTES NFR BLD: 16.3 %
MAGNESIUM SERPL-MCNC: 1.3 MG/DL
MCH RBC QN AUTO: 29.6 PG
MCH RBC QN AUTO: 29.6 PG
MCHC RBC AUTO-ENTMCNC: 34.9 G/DL
MCHC RBC AUTO-ENTMCNC: 34.9 G/DL
MCV RBC AUTO: 85 FL
MCV RBC AUTO: 85 FL
MONOCYTES # BLD AUTO: 1.4 K/UL
MONOCYTES # BLD AUTO: 1.4 K/UL
MONOCYTES NFR BLD: 14.4 %
MONOCYTES NFR BLD: 14.4 %
NEUTROPHILS # BLD AUTO: 6.7 K/UL
NEUTROPHILS # BLD AUTO: 6.7 K/UL
NEUTROPHILS NFR BLD: 68.4 %
NEUTROPHILS NFR BLD: 68.4 %
O+P STL TRI STN: NORMAL
PHOSPHATE SERPL-MCNC: 2.3 MG/DL
PLATELET # BLD AUTO: 258 K/UL
PLATELET # BLD AUTO: 258 K/UL
PMV BLD AUTO: 10.2 FL
PMV BLD AUTO: 10.2 FL
POTASSIUM SERPL-SCNC: 3.3 MMOL/L
POTASSIUM SERPL-SCNC: 3.3 MMOL/L
PROT SERPL-MCNC: 5.6 G/DL
PROT SERPL-MCNC: 5.6 G/DL
RBC # BLD AUTO: 3.72 M/UL
RBC # BLD AUTO: 3.72 M/UL
SODIUM SERPL-SCNC: 137 MMOL/L
SODIUM SERPL-SCNC: 137 MMOL/L
WBC # BLD AUTO: 9.76 K/UL
WBC # BLD AUTO: 9.76 K/UL

## 2018-11-29 PROCEDURE — 25000003 PHARM REV CODE 250: Performed by: NURSE PRACTITIONER

## 2018-11-29 PROCEDURE — 63600175 PHARM REV CODE 636 W HCPCS: Performed by: HOSPITALIST

## 2018-11-29 PROCEDURE — 80053 COMPREHEN METABOLIC PANEL: CPT

## 2018-11-29 PROCEDURE — 85025 COMPLETE CBC W/AUTO DIFF WBC: CPT

## 2018-11-29 PROCEDURE — 82550 ASSAY OF CK (CPK): CPT

## 2018-11-29 PROCEDURE — 84100 ASSAY OF PHOSPHORUS: CPT

## 2018-11-29 PROCEDURE — 63600175 PHARM REV CODE 636 W HCPCS: Performed by: NURSE PRACTITIONER

## 2018-11-29 PROCEDURE — 36415 COLL VENOUS BLD VENIPUNCTURE: CPT

## 2018-11-29 PROCEDURE — 83735 ASSAY OF MAGNESIUM: CPT

## 2018-11-29 RX ORDER — SODIUM,POTASSIUM PHOSPHATES 280-250MG
1 POWDER IN PACKET (EA) ORAL
Refills: 0 | COMMUNITY
Start: 2018-11-29 | End: 2018-12-09

## 2018-11-29 RX ORDER — AMLODIPINE BESYLATE 10 MG/1
10 TABLET ORAL DAILY
Qty: 30 TABLET | Refills: 0 | Status: SHIPPED | OUTPATIENT
Start: 2018-11-29 | End: 2019-11-29

## 2018-11-29 RX ORDER — LOPERAMIDE HYDROCHLORIDE 2 MG/1
2 CAPSULE ORAL 4 TIMES DAILY PRN
Refills: 0
Start: 2018-11-29 | End: 2018-12-02

## 2018-11-29 RX ORDER — DICYCLOMINE HYDROCHLORIDE 10 MG/1
10 CAPSULE ORAL 4 TIMES DAILY PRN
Qty: 20 CAPSULE | Refills: 0 | Status: SHIPPED | OUTPATIENT
Start: 2018-11-29

## 2018-11-29 RX ORDER — LISINOPRIL 5 MG/1
5 TABLET ORAL DAILY
Qty: 30 TABLET | Refills: 0 | Status: SHIPPED | OUTPATIENT
Start: 2018-11-29 | End: 2019-11-29

## 2018-11-29 RX ORDER — POTASSIUM CHLORIDE 20 MEQ/1
20 TABLET, EXTENDED RELEASE ORAL ONCE
Status: COMPLETED | OUTPATIENT
Start: 2018-11-29 | End: 2018-11-29

## 2018-11-29 RX ORDER — PANTOPRAZOLE SODIUM 40 MG/1
40 TABLET, DELAYED RELEASE ORAL DAILY
Qty: 30 TABLET | Refills: 0 | Status: SHIPPED | OUTPATIENT
Start: 2018-11-29 | End: 2019-11-29

## 2018-11-29 RX ORDER — LANOLIN ALCOHOL/MO/W.PET/CERES
400 CREAM (GRAM) TOPICAL 2 TIMES DAILY
Refills: 0 | COMMUNITY
Start: 2018-11-29

## 2018-11-29 RX ORDER — CIPROFLOXACIN 250 MG/1
250 TABLET, FILM COATED ORAL EVERY 12 HOURS
Qty: 6 TABLET | Refills: 0 | Status: SHIPPED | OUTPATIENT
Start: 2018-11-29 | End: 2018-12-02

## 2018-11-29 RX ADMIN — HYDROCORTISONE: 1 CREAM TOPICAL at 09:11

## 2018-11-29 RX ADMIN — POTASSIUM CHLORIDE 20 MEQ: 1500 TABLET, EXTENDED RELEASE ORAL at 09:11

## 2018-11-29 RX ADMIN — MAGNESIUM OXIDE TAB 400 MG (241.3 MG ELEMENTAL MG) 400 MG: 400 (241.3 MG) TAB at 09:11

## 2018-11-29 RX ADMIN — CEFTRIAXONE 1 G: 1 INJECTION, SOLUTION INTRAVENOUS at 09:11

## 2018-11-29 RX ADMIN — PANTOPRAZOLE SODIUM 40 MG: 40 TABLET, DELAYED RELEASE ORAL at 09:11

## 2018-11-29 RX ADMIN — DICYCLOMINE HYDROCHLORIDE 10 MG: 10 CAPSULE ORAL at 09:11

## 2018-11-29 RX ADMIN — POTASSIUM, SODIUM PHOSPHATES 280 MG-160 MG-250 MG ORAL POWDER PACKET 1 PACKET: POWDER IN PACKET at 05:11

## 2018-11-29 RX ADMIN — SODIUM CHLORIDE AND POTASSIUM CHLORIDE: 9; 1.49 INJECTION, SOLUTION INTRAVENOUS at 05:11

## 2018-11-29 RX ADMIN — AMLODIPINE BESYLATE 10 MG: 10 TABLET ORAL at 09:11

## 2018-11-29 RX ADMIN — LISINOPRIL 5 MG: 5 TABLET ORAL at 09:11

## 2018-11-29 NOTE — PLAN OF CARE
11/29/18 1509   Final Note   Assessment Type Final Discharge Note   Anticipated Discharge Disposition Home   Right Care Referral Info   Post Acute Recommendation No Care

## 2018-11-29 NOTE — PLAN OF CARE
Problem: Patient Care Overview  Goal: Plan of Care Review  Outcome: Ongoing (interventions implemented as appropriate)  Pt had 4 loose bms this shift, but reports they are becoming more formedt. Denies any pain or n/v. IVF infusing as ordered. NSR on the monitor. VSS. Chart reviewed. Will monitor.
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
## 2018-11-30 LAB
E COLI SXT1 STL QL IA: NEGATIVE
E COLI SXT2 STL QL IA: NEGATIVE

## 2018-12-02 LAB — BACTERIA STL CULT: NORMAL

## 2018-12-05 ENCOUNTER — TELEPHONE (OUTPATIENT)
Dept: INTERNAL MEDICINE | Facility: CLINIC | Age: 48
End: 2018-12-05

## 2018-12-05 ENCOUNTER — LAB VISIT (OUTPATIENT)
Dept: LAB | Facility: HOSPITAL | Age: 48
End: 2018-12-05
Attending: FAMILY MEDICINE
Payer: COMMERCIAL

## 2018-12-05 ENCOUNTER — OFFICE VISIT (OUTPATIENT)
Dept: INTERNAL MEDICINE | Facility: CLINIC | Age: 48
End: 2018-12-05
Payer: COMMERCIAL

## 2018-12-05 VITALS
BODY MASS INDEX: 36.93 KG/M2 | TEMPERATURE: 98 F | RESPIRATION RATE: 18 BRPM | HEART RATE: 84 BPM | WEIGHT: 249.31 LBS | OXYGEN SATURATION: 100 % | DIASTOLIC BLOOD PRESSURE: 70 MMHG | SYSTOLIC BLOOD PRESSURE: 134 MMHG | HEIGHT: 69 IN

## 2018-12-05 DIAGNOSIS — I10 ESSENTIAL HYPERTENSION: Chronic | ICD-10-CM

## 2018-12-05 DIAGNOSIS — N93.9 VAGINAL BLEEDING: ICD-10-CM

## 2018-12-05 DIAGNOSIS — R74.8 ELEVATED LIVER ENZYMES: Primary | ICD-10-CM

## 2018-12-05 DIAGNOSIS — N30.00 ACUTE CYSTITIS WITHOUT HEMATURIA: ICD-10-CM

## 2018-12-05 DIAGNOSIS — R74.8 ELEVATED LIVER ENZYMES: ICD-10-CM

## 2018-12-05 PROBLEM — K29.80 DUODENITIS WITHOUT BLEEDING: Status: RESOLVED | Noted: 2018-11-27 | Resolved: 2018-12-05

## 2018-12-05 PROBLEM — F41.9 ANXIETY: Chronic | Status: RESOLVED | Noted: 2018-11-27 | Resolved: 2018-12-05

## 2018-12-05 PROBLEM — R19.7 DIARRHEA: Status: RESOLVED | Noted: 2018-11-26 | Resolved: 2018-12-05

## 2018-12-05 LAB
ALBUMIN SERPL BCP-MCNC: 3.2 G/DL
ALP SERPL-CCNC: 130 U/L
ALT SERPL W/O P-5'-P-CCNC: 73 U/L
ANION GAP SERPL CALC-SCNC: 9 MMOL/L
AST SERPL-CCNC: 29 U/L
BASOPHILS # BLD AUTO: 0.02 K/UL
BASOPHILS NFR BLD: 0.2 %
BILIRUB SERPL-MCNC: 0.3 MG/DL
BUN SERPL-MCNC: 12 MG/DL
CALCIUM SERPL-MCNC: 9.2 MG/DL
CHLORIDE SERPL-SCNC: 105 MMOL/L
CO2 SERPL-SCNC: 22 MMOL/L
CREAT SERPL-MCNC: 0.8 MG/DL
DIFFERENTIAL METHOD: ABNORMAL
EOSINOPHIL # BLD AUTO: 0.1 K/UL
EOSINOPHIL NFR BLD: 0.8 %
ERYTHROCYTE [DISTWIDTH] IN BLOOD BY AUTOMATED COUNT: 14 %
EST. GFR  (AFRICAN AMERICAN): >60 ML/MIN/1.73 M^2
EST. GFR  (NON AFRICAN AMERICAN): >60 ML/MIN/1.73 M^2
GLUCOSE SERPL-MCNC: 86 MG/DL
HCT VFR BLD AUTO: 31.8 %
HGB BLD-MCNC: 10.7 G/DL
LYMPHOCYTES # BLD AUTO: 2.3 K/UL
LYMPHOCYTES NFR BLD: 17.1 %
MCH RBC QN AUTO: 29.5 PG
MCHC RBC AUTO-ENTMCNC: 33.6 G/DL
MCV RBC AUTO: 88 FL
MONOCYTES # BLD AUTO: 1.1 K/UL
MONOCYTES NFR BLD: 8.3 %
NEUTROPHILS # BLD AUTO: 9.7 K/UL
NEUTROPHILS NFR BLD: 73.1 %
PLATELET # BLD AUTO: 334 K/UL
PMV BLD AUTO: 9.7 FL
POTASSIUM SERPL-SCNC: 3.5 MMOL/L
PROT SERPL-MCNC: 6.3 G/DL
RBC # BLD AUTO: 3.63 M/UL
SODIUM SERPL-SCNC: 136 MMOL/L
WBC # BLD AUTO: 13.31 K/UL

## 2018-12-05 PROCEDURE — 99999 PR PBB SHADOW E&M-EST. PATIENT-LVL IV: CPT | Mod: PBBFAC,,, | Performed by: FAMILY MEDICINE

## 2018-12-05 PROCEDURE — 85025 COMPLETE CBC W/AUTO DIFF WBC: CPT | Mod: PO

## 2018-12-05 PROCEDURE — 80053 COMPREHEN METABOLIC PANEL: CPT | Mod: PO

## 2018-12-05 PROCEDURE — 90686 IIV4 VACC NO PRSV 0.5 ML IM: CPT | Mod: S$GLB,,, | Performed by: FAMILY MEDICINE

## 2018-12-05 PROCEDURE — 99204 OFFICE O/P NEW MOD 45 MIN: CPT | Mod: 25,S$GLB,, | Performed by: FAMILY MEDICINE

## 2018-12-05 PROCEDURE — 36415 COLL VENOUS BLD VENIPUNCTURE: CPT | Mod: PO

## 2018-12-05 PROCEDURE — 90471 IMMUNIZATION ADMIN: CPT | Mod: S$GLB,,, | Performed by: FAMILY MEDICINE

## 2018-12-05 PROCEDURE — 3008F BODY MASS INDEX DOCD: CPT | Mod: CPTII,S$GLB,, | Performed by: FAMILY MEDICINE

## 2018-12-05 NOTE — TELEPHONE ENCOUNTER
----- Message from Jenn Delgado sent at 12/5/2018  4:04 PM CST -----  Pt at [phone]//states she is returning your call//please call again/kaycee/remi

## 2018-12-05 NOTE — ASSESSMENT & PLAN NOTE
I suggested that, considering she is on Nexplanon, this could be secondary to the physical stress that she recently went through with her illness.  If the bleeding continues into next week we should look into this further.  Advised her to let me know
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
## 2018-12-06 ENCOUNTER — TELEPHONE (OUTPATIENT)
Dept: INTERNAL MEDICINE | Facility: CLINIC | Age: 48
End: 2018-12-06

## 2018-12-06 NOTE — TELEPHONE ENCOUNTER
----- Message from Dex Guzmán DO sent at 12/5/2018 10:02 AM CST -----  Labs improved overall with liver looking much better. Elevated WBC however. She was asymptomatic for infection at time of appointment. Advise her to let us know if she develops fever or any worsening symptoms.

## 2019-11-14 DIAGNOSIS — Z12.39 BREAST CANCER SCREENING: ICD-10-CM
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
## 2020-08-14 ENCOUNTER — PATIENT OUTREACH (OUTPATIENT)
Dept: ADMINISTRATIVE | Facility: HOSPITAL | Age: 50
End: 2020-08-14

## 2020-10-06 ENCOUNTER — PATIENT MESSAGE (OUTPATIENT)
Dept: ADMINISTRATIVE | Facility: HOSPITAL | Age: 50
End: 2020-10-06

## 2020-11-04 ENCOUNTER — PATIENT OUTREACH (OUTPATIENT)
Dept: ADMINISTRATIVE | Facility: HOSPITAL | Age: 50
End: 2020-11-04

## 2020-11-04 NOTE — PROGRESS NOTES
[employer] HTN Report. Attempting to contact pt to obtain a home BP reading if available and schedule annual exam. Unable to reach patient at this time. Left voicemail.

## 2020-11-06 ENCOUNTER — PATIENT OUTREACH (OUTPATIENT)
Dept: ADMINISTRATIVE | Facility: HOSPITAL | Age: 50
End: 2020-11-06

## 2020-11-06 NOTE — PROGRESS NOTES
[employer] HTN REPORT: Attempting to contact pt to see if pt takes home BP readings. Pt also needs pcp visit. Unable to reach patient at this time. Left voicemail. We need to know who her PCP is she has not been seen since 12/2018.

## 2020-11-23 ENCOUNTER — PATIENT OUTREACH (OUTPATIENT)
Dept: ADMINISTRATIVE | Facility: HOSPITAL | Age: 50
End: 2020-11-23

## 2020-11-23 NOTE — PROGRESS NOTES
HTN Report. Attempting to contact pt to obtain a home BP reading, if available and schedule overdue annual exam with PCP. Unable to reach patient at this time. Left voicemail. Need to clarify  PCP, last office visit 12/2018.

## 2021-08-01 ENCOUNTER — HOSPITAL ENCOUNTER (EMERGENCY)
Facility: HOSPITAL | Age: 51
Discharge: HOME | End: 2021-08-01
Attending: EMERGENCY MEDICINE

## 2021-08-01 VITALS
HEART RATE: 81 BPM | WEIGHT: 249.13 LBS | BODY MASS INDEX: 40.04 KG/M2 | SYSTOLIC BLOOD PRESSURE: 108 MMHG | HEIGHT: 66 IN | RESPIRATION RATE: 14 BRPM | TEMPERATURE: 101 F | DIASTOLIC BLOOD PRESSURE: 58 MMHG | OXYGEN SATURATION: 96 %

## 2021-08-01 DIAGNOSIS — T50.901A OVERDOSE: ICD-10-CM

## 2021-08-01 DIAGNOSIS — N17.9 AKI (ACUTE KIDNEY INJURY): ICD-10-CM

## 2021-08-01 DIAGNOSIS — R50.9 FEVER, UNSPECIFIED FEVER CAUSE: ICD-10-CM

## 2021-08-01 DIAGNOSIS — F19.10 SUBSTANCE ABUSE: Primary | ICD-10-CM

## 2021-08-01 DIAGNOSIS — T40.2X1A OPIOID OVERDOSE, ACCIDENTAL OR UNINTENTIONAL, INITIAL ENCOUNTER: ICD-10-CM

## 2021-08-01 DIAGNOSIS — N39.0 URINARY TRACT INFECTION WITHOUT HEMATURIA, SITE UNSPECIFIED: ICD-10-CM

## 2021-08-01 LAB
ALBUMIN SERPL BCP-MCNC: 4.3 G/DL (ref 3.5–5.2)
ALP SERPL-CCNC: 111 U/L (ref 55–135)
ALT SERPL W/O P-5'-P-CCNC: 26 U/L (ref 10–44)
AMPHET+METHAMPHET UR QL: NEGATIVE
ANION GAP SERPL CALC-SCNC: 14 MMOL/L (ref 8–16)
AST SERPL-CCNC: 28 U/L (ref 10–40)
BACTERIA #/AREA URNS AUTO: ABNORMAL /HPF
BARBITURATES UR QL SCN>200 NG/ML: NEGATIVE
BASOPHILS # BLD AUTO: 0.05 K/UL (ref 0–0.2)
BASOPHILS NFR BLD: 0.7 % (ref 0–1.9)
BENZODIAZ UR QL SCN>200 NG/ML: NEGATIVE
BILIRUB SERPL-MCNC: 0.3 MG/DL (ref 0.1–1)
BILIRUB UR QL STRIP: NEGATIVE
BUN SERPL-MCNC: 23 MG/DL (ref 6–20)
BZE UR QL SCN: NEGATIVE
CALCIUM SERPL-MCNC: 9.9 MG/DL (ref 8.7–10.5)
CANNABINOIDS UR QL SCN: NEGATIVE
CHLORIDE SERPL-SCNC: 108 MMOL/L (ref 95–110)
CK SERPL-CCNC: 129 U/L (ref 20–180)
CLARITY UR REFRACT.AUTO: ABNORMAL
CO2 SERPL-SCNC: 17 MMOL/L (ref 23–29)
COLOR UR AUTO: YELLOW
CREAT SERPL-MCNC: 1.5 MG/DL (ref 0.5–1.4)
CREAT UR-MCNC: 47 MG/DL (ref 15–325)
CTP QC/QA: YES
DIFFERENTIAL METHOD: ABNORMAL
EOSINOPHIL # BLD AUTO: 0.1 K/UL (ref 0–0.5)
EOSINOPHIL NFR BLD: 1.3 % (ref 0–8)
ERYTHROCYTE [DISTWIDTH] IN BLOOD BY AUTOMATED COUNT: 13.8 % (ref 11.5–14.5)
EST. GFR  (AFRICAN AMERICAN): 46.5 ML/MIN/1.73 M^2
EST. GFR  (NON AFRICAN AMERICAN): 40.3 ML/MIN/1.73 M^2
GLUCOSE SERPL-MCNC: 134 MG/DL (ref 70–110)
GLUCOSE UR QL STRIP: NEGATIVE
HCT VFR BLD AUTO: 37.4 % (ref 37–48.5)
HGB BLD-MCNC: 12.2 G/DL (ref 12–16)
HGB UR QL STRIP: ABNORMAL
IMM GRANULOCYTES # BLD AUTO: 0.02 K/UL (ref 0–0.04)
IMM GRANULOCYTES NFR BLD AUTO: 0.3 % (ref 0–0.5)
KETONES UR QL STRIP: NEGATIVE
LEUKOCYTE ESTERASE UR QL STRIP: ABNORMAL
LYMPHOCYTES # BLD AUTO: 1.6 K/UL (ref 1–4.8)
LYMPHOCYTES NFR BLD: 23.2 % (ref 18–48)
MCH RBC QN AUTO: 30.7 PG (ref 27–31)
MCHC RBC AUTO-ENTMCNC: 32.6 G/DL (ref 32–36)
MCV RBC AUTO: 94 FL (ref 82–98)
METHADONE UR QL SCN>300 NG/ML: NEGATIVE
MICROSCOPIC COMMENT: ABNORMAL
MONOCYTES # BLD AUTO: 0.5 K/UL (ref 0.3–1)
MONOCYTES NFR BLD: 6.9 % (ref 4–15)
NEUTROPHILS # BLD AUTO: 4.6 K/UL (ref 1.8–7.7)
NEUTROPHILS NFR BLD: 67.6 % (ref 38–73)
NITRITE UR QL STRIP: NEGATIVE
NRBC BLD-RTO: 0 /100 WBC
OPIATES UR QL SCN: NEGATIVE
PCP UR QL SCN>25 NG/ML: NEGATIVE
PH UR STRIP: 6 [PH] (ref 5–8)
PLATELET # BLD AUTO: 329 K/UL (ref 150–450)
PMV BLD AUTO: 10.2 FL (ref 9.2–12.9)
POCT GLUCOSE: 228 MG/DL (ref 70–110)
POTASSIUM SERPL-SCNC: 4.3 MMOL/L (ref 3.5–5.1)
PROT SERPL-MCNC: 7.9 G/DL (ref 6–8.4)
PROT UR QL STRIP: NEGATIVE
RBC # BLD AUTO: 3.97 M/UL (ref 4–5.4)
RBC #/AREA URNS AUTO: 3 /HPF (ref 0–4)
SARS-COV-2 RDRP RESP QL NAA+PROBE: NEGATIVE
SODIUM SERPL-SCNC: 139 MMOL/L (ref 136–145)
SP GR UR STRIP: 1.02 (ref 1–1.03)
SQUAMOUS #/AREA URNS AUTO: 3 /HPF
TOXICOLOGY INFORMATION: NORMAL
URN SPEC COLLECT METH UR: ABNORMAL
UROBILINOGEN UR STRIP-ACNC: NEGATIVE EU/DL
WBC # BLD AUTO: 6.78 K/UL (ref 3.9–12.7)
WBC #/AREA URNS AUTO: 28 /HPF (ref 0–5)

## 2021-08-01 PROCEDURE — 82550 ASSAY OF CK (CPK): CPT | Mod: ER | Performed by: EMERGENCY MEDICINE

## 2021-08-01 PROCEDURE — 25000003 PHARM REV CODE 250: Mod: ER | Performed by: EMERGENCY MEDICINE

## 2021-08-01 PROCEDURE — 87086 URINE CULTURE/COLONY COUNT: CPT | Performed by: EMERGENCY MEDICINE

## 2021-08-01 PROCEDURE — 96361 HYDRATE IV INFUSION ADD-ON: CPT | Mod: ER

## 2021-08-01 PROCEDURE — 80053 COMPREHEN METABOLIC PANEL: CPT | Mod: ER | Performed by: EMERGENCY MEDICINE

## 2021-08-01 PROCEDURE — U0002 COVID-19 LAB TEST NON-CDC: HCPCS | Mod: ER | Performed by: EMERGENCY MEDICINE

## 2021-08-01 PROCEDURE — 93010 ELECTROCARDIOGRAM REPORT: CPT | Mod: ,,, | Performed by: INTERNAL MEDICINE

## 2021-08-01 PROCEDURE — 93005 ELECTROCARDIOGRAM TRACING: CPT | Mod: ER

## 2021-08-01 PROCEDURE — 81000 URINALYSIS NONAUTO W/SCOPE: CPT | Mod: ER | Performed by: EMERGENCY MEDICINE

## 2021-08-01 PROCEDURE — 93010 EKG 12-LEAD: ICD-10-PCS | Mod: ,,, | Performed by: INTERNAL MEDICINE

## 2021-08-01 PROCEDURE — 27000221 HC OXYGEN, UP TO 24 HOURS: Mod: ER

## 2021-08-01 PROCEDURE — 96374 THER/PROPH/DIAG INJ IV PUSH: CPT | Mod: ER

## 2021-08-01 PROCEDURE — 87389 HIV-1 AG W/HIV-1&-2 AB AG IA: CPT | Performed by: EMERGENCY MEDICINE

## 2021-08-01 PROCEDURE — 85025 COMPLETE CBC W/AUTO DIFF WBC: CPT | Mod: ER | Performed by: EMERGENCY MEDICINE

## 2021-08-01 PROCEDURE — 99291 CRITICAL CARE FIRST HOUR: CPT | Mod: 25,ER

## 2021-08-01 PROCEDURE — 80307 DRUG TEST PRSMV CHEM ANLYZR: CPT | Mod: ER | Performed by: EMERGENCY MEDICINE

## 2021-08-01 PROCEDURE — 63600175 PHARM REV CODE 636 W HCPCS: Mod: ER | Performed by: EMERGENCY MEDICINE

## 2021-08-01 PROCEDURE — 86803 HEPATITIS C AB TEST: CPT | Performed by: EMERGENCY MEDICINE

## 2021-08-01 RX ORDER — NALOXONE HYDROCHLORIDE 4 MG/.1ML
SPRAY NASAL
Qty: 1 EACH | Refills: 0 | Status: SHIPPED | OUTPATIENT
Start: 2021-08-01

## 2021-08-01 RX ORDER — ACETAMINOPHEN 325 MG/1
650 TABLET ORAL
Status: COMPLETED | OUTPATIENT
Start: 2021-08-01 | End: 2021-08-01

## 2021-08-01 RX ORDER — NALOXONE HCL 0.4 MG/ML
0.4 VIAL (ML) INJECTION
Status: COMPLETED | OUTPATIENT
Start: 2021-08-01 | End: 2021-08-01

## 2021-08-01 RX ORDER — NITROFURANTOIN 25; 75 MG/1; MG/1
100 CAPSULE ORAL 2 TIMES DAILY
Qty: 10 CAPSULE | Refills: 0 | Status: SHIPPED | OUTPATIENT
Start: 2021-08-01 | End: 2021-08-06

## 2021-08-01 RX ORDER — NITROFURANTOIN 25; 75 MG/1; MG/1
100 CAPSULE ORAL
Status: COMPLETED | OUTPATIENT
Start: 2021-08-01 | End: 2021-08-01

## 2021-08-01 RX ADMIN — SODIUM CHLORIDE 1000 ML: 0.9 INJECTION, SOLUTION INTRAVENOUS at 02:08

## 2021-08-01 RX ADMIN — NALXONE HYDROCHLORIDE 0.4 MG: 0.4 INJECTION INTRAMUSCULAR; INTRAVENOUS; SUBCUTANEOUS at 11:08

## 2021-08-01 RX ADMIN — ACETAMINOPHEN 650 MG: 325 TABLET ORAL at 02:08

## 2021-08-01 RX ADMIN — NITROFURANTOIN (MONOHYDRATE/MACROCRYSTALS) 100 MG: 75; 25 CAPSULE ORAL at 04:08

## 2021-08-02 LAB
HCV AB SERPL QL IA: NEGATIVE
HEP C VIRUS HOLD SPECIMEN: NORMAL
HIV 1+2 AB+HIV1 P24 AG SERPL QL IA: NEGATIVE

## 2021-08-03 LAB
BACTERIA UR CULT: NORMAL
BACTERIA UR CULT: NORMAL

## 2024-04-04 ENCOUNTER — HOSPITAL ENCOUNTER (EMERGENCY)
Facility: HOSPITAL | Age: 54
Discharge: HOME | End: 2024-04-04
Attending: EMERGENCY MEDICINE
Payer: COMMERCIAL

## 2024-04-04 ENCOUNTER — TELEPHONE (OUTPATIENT)
Dept: SPORTS MEDICINE | Facility: CLINIC | Age: 54
End: 2024-04-04
Payer: COMMERCIAL

## 2024-04-04 VITALS
BODY MASS INDEX: 36.94 KG/M2 | OXYGEN SATURATION: 99 % | DIASTOLIC BLOOD PRESSURE: 89 MMHG | HEART RATE: 99 BPM | RESPIRATION RATE: 17 BRPM | TEMPERATURE: 98 F | SYSTOLIC BLOOD PRESSURE: 198 MMHG | HEIGHT: 68 IN | WEIGHT: 243.75 LBS

## 2024-04-04 DIAGNOSIS — M25.511 RIGHT SHOULDER PAIN: ICD-10-CM

## 2024-04-04 PROCEDURE — 63600175 PHARM REV CODE 636 W HCPCS: Mod: ER | Performed by: EMERGENCY MEDICINE

## 2024-04-04 PROCEDURE — 96372 THER/PROPH/DIAG INJ SC/IM: CPT | Performed by: EMERGENCY MEDICINE

## 2024-04-04 PROCEDURE — 99284 EMERGENCY DEPT VISIT MOD MDM: CPT | Mod: 25,ER

## 2024-04-04 RX ORDER — DEXAMETHASONE SODIUM PHOSPHATE 4 MG/ML
8 INJECTION, SOLUTION INTRA-ARTICULAR; INTRALESIONAL; INTRAMUSCULAR; INTRAVENOUS; SOFT TISSUE
Status: COMPLETED | OUTPATIENT
Start: 2024-04-04 | End: 2024-04-04

## 2024-04-04 RX ORDER — PANTOPRAZOLE SODIUM 40 MG/1
40 TABLET, DELAYED RELEASE ORAL DAILY
Qty: 30 TABLET | Refills: 0 | Status: SHIPPED | OUTPATIENT
Start: 2024-04-04 | End: 2025-04-04

## 2024-04-04 RX ADMIN — DEXAMETHASONE SODIUM PHOSPHATE 8 MG: 4 INJECTION, SOLUTION INTRA-ARTICULAR; INTRALESIONAL; INTRAMUSCULAR; INTRAVENOUS; SOFT TISSUE at 10:04

## 2024-04-04 NOTE — Clinical Note
"Carla Garduno (Brandelyn)ley was seen and treated in our emergency department on 4/4/2024.  She may return to work on 04/05/2024.       If you have any questions or concerns, please don't hesitate to call.       RN    "

## 2024-04-04 NOTE — TELEPHONE ENCOUNTER
Pt agreed to visit. Would like corticosteroid injection , let her know the MD will determine if that is best case for her specific situation but this provider she is with does do injections.

## 2024-04-04 NOTE — ED PROVIDER NOTES
Encounter Date: 2024       History     Chief Complaint   Patient presents with    Shoulder Pain     Recalls 1.5-2 weeks ago she felt something in her left shoulder while trying to get up; now having difficulty lifting arm above head and doing simple task. Been taking tylenol and aleve, has hx of pancreas issues was instructed not to take ibuprofen.     54 y/o F with PMH of depression, opiod abuse, HTN here with c/o right shoulder pain over the past 1-2 weeks that is gradually worsening. Pain is moderate, constant, and preventing patient from lifting her arm above her head. She is unable to work right now due to the pain. Denies any trauma, fever, chills. Taking tylenol/ibuprofen.     The history is provided by the patient.     Review of patient's allergies indicates:  No Known Allergies  Past Medical History:   Diagnosis Date    DANIELA (acute kidney injury) 2018    Anxiety     Depression     Duodenitis without bleeding 2018    History of opioid abuse     Hypertension     Hyponatremia and hypokalemia 2018    Personality disorder      Past Surgical History:   Procedure Laterality Date     SECTION       Family History   Problem Relation Age of Onset    Muscular dystrophy Paternal Grandmother     Suicide Paternal Grandmother     Alcohol abuse Father     Drug abuse Father     No Known Problems Mother     Pneumonia Maternal Grandmother     Alzheimer's disease Maternal Grandfather     Heart disease Paternal Grandfather      Social History     Tobacco Use    Smoking status: Never    Smokeless tobacco: Never   Substance Use Topics    Alcohol use: Yes     Comment: once every 3 months     Drug use: No     Review of Systems   Constitutional:  Negative for diaphoresis and fever.   HENT:  Negative for congestion, dental problem and sore throat.    Eyes:  Negative for pain and visual disturbance.   Respiratory:  Negative for cough and shortness of breath.    Cardiovascular:  Negative for chest pain and  palpitations.   Gastrointestinal:  Negative for abdominal pain, diarrhea, nausea and vomiting.   Genitourinary:  Negative for dysuria and flank pain.   Musculoskeletal:  Positive for arthralgias. Negative for back pain and neck pain.   Skin:  Negative for rash and wound.   Neurological:  Negative for weakness, numbness and headaches.   Psychiatric/Behavioral:  Negative for agitation and confusion.        Physical Exam     Initial Vitals   BP Pulse Resp Temp SpO2   04/04/24 0843 04/04/24 0840 04/04/24 0840 04/04/24 0840 04/04/24 0840   (!) 198/89 100 17 97.7 °F (36.5 °C) 99 %      MAP       --                Physical Exam    Constitutional: She appears well-developed and well-nourished.   HENT:   Head: Normocephalic and atraumatic.   Eyes: EOM are normal. Pupils are equal, round, and reactive to light.   Neck: Neck supple.   Normal range of motion.  Cardiovascular:  Normal rate and regular rhythm.           Pulmonary/Chest: Breath sounds normal. No respiratory distress.   Abdominal: She exhibits no distension. There is no abdominal tenderness.   Musculoskeletal:      Cervical back: Normal range of motion and neck supple.      Comments: There is mild right shoulder tenderness. Pain reproduced with active abduction of the shoulder. No deformity. No edema.      Neurological: She is alert and oriented to person, place, and time.   Skin: Skin is warm and dry.   Psychiatric: She has a normal mood and affect.         ED Course   Procedures  Labs Reviewed   HIV 1 / 2 ANTIBODY   HEPATITIS C ANTIBODY   HEP C VIRUS HOLD SPECIMEN          Imaging Results              X-Ray Shoulder Complete 2 View Right (Final result)  Result time 04/04/24 09:29:12      Final result by Ezekiel Giraldo MD (04/04/24 09:29:12)                   Impression:      No acute fracture or dislocation.      Electronically signed by: Ezekiel Giraldo MD  Date:    04/04/2024  Time:    09:29               Narrative:    EXAMINATION:  XR SHOULDER COMPLETE 2 OR  MORE VIEWS RIGHT    CLINICAL HISTORY:  XR SHOULDER COMPLETE 2 OR MORE VIEWS RIGHTPain in right shoulder    COMPARISON:  None    FINDINGS:  Three views of the right shoulder were obtained.    No evidence of acute fracture or dislocation.  Bony mineralization is normal.  Soft tissues are unremarkable.   Mild AC joint degenerative changes.  Mild narrowing the rotator interval which could reflect chronic rotator cuff injury.                                       Medications   dexAMETHasone injection 8 mg (has no administration in time range)     Medical Decision Making  DDx includes tendonitis, arthritis, fracture, ca    Amount and/or Complexity of Data Reviewed  Radiology: ordered.    Risk  Prescription drug management.                                      Clinical Impression:  Final diagnoses:  [M25.511] Right shoulder pain          ED Disposition Condition    Discharge Stable          ED Prescriptions       Medication Sig Dispense Start Date End Date Auth. Provider    pantoprazole (PROTONIX) 40 MG tablet Take 1 tablet (40 mg total) by mouth once daily. 30 tablet 4/4/2024 4/4/2025 Dex Hewitt MD          Follow-up Information       Follow up With Specialties Details Why Contact Info    Clinic, [facility]  Schedule an appointment as soon as possible for a visit in 2 days For re-evaluation and further treatment [address] [place] [address] [address]  [place] [address]  [phone]      [facility] Emergency Medicine Go today If symptoms worsen, For re-evaluation and further treatment, As needed [id number] [address] [address] [phone]             Dex Hewitt MD  04/04/24 6014

## 2024-04-04 NOTE — Clinical Note
"Carla Garduno (Brandelyn)ley was seen and treated in our emergency department on 4/4/2024.  She may return to work on 04/05/2024.       If you have any questions or concerns, please don't hesitate to call.       MD    "

## 2024-04-09 ENCOUNTER — OFFICE VISIT (OUTPATIENT)
Dept: ORTHOPEDICS | Facility: CLINIC | Age: 54
End: 2024-04-09
Payer: COMMERCIAL

## 2024-04-09 VITALS — BODY MASS INDEX: 36.95 KG/M2 | HEIGHT: 68 IN | WEIGHT: 243.81 LBS

## 2024-04-09 DIAGNOSIS — G89.29 CHRONIC RIGHT SHOULDER PAIN: ICD-10-CM

## 2024-04-09 DIAGNOSIS — M75.101 TEAR OF RIGHT ROTATOR CUFF, UNSPECIFIED TEAR EXTENT, UNSPECIFIED WHETHER TRAUMATIC: Primary | ICD-10-CM

## 2024-04-09 DIAGNOSIS — M25.511 CHRONIC RIGHT SHOULDER PAIN: ICD-10-CM

## 2024-04-09 DIAGNOSIS — M67.911 TENDINOPATHY OF RIGHT ROTATOR CUFF: ICD-10-CM

## 2024-04-09 DIAGNOSIS — M25.511 RIGHT SHOULDER PAIN, UNSPECIFIED CHRONICITY: ICD-10-CM

## 2024-04-09 PROCEDURE — 1159F MED LIST DOCD IN RCRD: CPT | Mod: CPTII,S$GLB,, | Performed by: STUDENT IN AN ORGANIZED HEALTH CARE EDUCATION/TRAINING PROGRAM

## 2024-04-09 PROCEDURE — 99999 PR PBB SHADOW E&M-EST. PATIENT-LVL IV: CPT | Mod: PBBFAC,,, | Performed by: STUDENT IN AN ORGANIZED HEALTH CARE EDUCATION/TRAINING PROGRAM

## 2024-04-09 PROCEDURE — 20611 DRAIN/INJ JOINT/BURSA W/US: CPT | Mod: RT,S$GLB,, | Performed by: STUDENT IN AN ORGANIZED HEALTH CARE EDUCATION/TRAINING PROGRAM

## 2024-04-09 PROCEDURE — 99205 OFFICE O/P NEW HI 60 MIN: CPT | Mod: 25,S$GLB,, | Performed by: STUDENT IN AN ORGANIZED HEALTH CARE EDUCATION/TRAINING PROGRAM

## 2024-04-09 PROCEDURE — 1160F RVW MEDS BY RX/DR IN RCRD: CPT | Mod: CPTII,S$GLB,, | Performed by: STUDENT IN AN ORGANIZED HEALTH CARE EDUCATION/TRAINING PROGRAM

## 2024-04-09 PROCEDURE — 3008F BODY MASS INDEX DOCD: CPT | Mod: CPTII,S$GLB,, | Performed by: STUDENT IN AN ORGANIZED HEALTH CARE EDUCATION/TRAINING PROGRAM

## 2024-04-09 RX ORDER — TRIAMCINOLONE ACETONIDE 40 MG/ML
40 INJECTION, SUSPENSION INTRA-ARTICULAR; INTRAMUSCULAR
Status: DISCONTINUED | OUTPATIENT
Start: 2024-04-09 | End: 2024-04-09 | Stop reason: HOSPADM

## 2024-04-09 RX ORDER — METHOCARBAMOL 500 MG/1
500 TABLET, FILM COATED ORAL 4 TIMES DAILY PRN
Qty: 40 TABLET | Refills: 1 | Status: SHIPPED | OUTPATIENT
Start: 2024-04-09 | End: 2024-05-01

## 2024-04-09 RX ADMIN — TRIAMCINOLONE ACETONIDE 40 MG: 40 INJECTION, SUSPENSION INTRA-ARTICULAR; INTRAMUSCULAR at 09:04

## 2024-04-09 NOTE — PROCEDURES
Sports Medicine US - Guidance for Needle Placement    Date/Time: 4/9/2024 9:40 AM    Performed by: Jhony Castellanos MD  Authorized by: Jhony Castellanos MD  Preparation: Patient was prepped and draped in the usual sterile fashion.  Local anesthesia used: no    Anesthesia:  Local anesthesia used: no    Sedation:  Patient sedated: no    Patient tolerance: patient tolerated the procedure well with no immediate complications  Comments: Ultrasound guidance was used for needle localization. Images were saved and stored for documentation. The appropriate structures were visualized. Dynamic visualization of the needle was continuous throughout the procedures and maintained good position.

## 2024-04-09 NOTE — PROGRESS NOTES
Patient ID: Carla Naranjo  YOB: 1970  MRN: [id number]    Chief Complaint: Pain of the Right Shoulder      Referred By: Dex Hewitt MD    History of Present Illness: Carla Naranjo is a right-hand dominant 53 y.o. female who presents today with right shoulder pain over the past 1-2 weeks that is gradually worsening. Pain is moderate, constant, and preventing patient from lifting her arm above her head. She is unable to work right now due to the pain. Denies any trauma, fever, chills. Taking tylenol/ibuprofen. Was seen in ER on 24, was given dexamethasone injection, patient states that this did not provide any relief.     The patient is active in none.  Occupation: [employer]      Past Medical History:   Past Medical History:   Diagnosis Date    DANIELA (acute kidney injury) 2018    Anxiety     Depression     Duodenitis without bleeding 2018    History of opioid abuse     Hypertension     Hyponatremia and hypokalemia 2018    Personality disorder      Past Surgical History:   Procedure Laterality Date     SECTION       Family History   Problem Relation Age of Onset    Muscular dystrophy Paternal Grandmother     Suicide Paternal Grandmother     Alcohol abuse Father     Drug abuse Father     No Known Problems Mother     Pneumonia Maternal Grandmother     Alzheimer's disease Maternal Grandfather     Heart disease Paternal Grandfather      Social History     Socioeconomic History    Marital status:    Tobacco Use    Smoking status: Never    Smokeless tobacco: Never   Substance and Sexual Activity    Alcohol use: Yes     Comment: once every 3 months     Drug use: No    Sexual activity: Yes     Partners: Male     Medication List with Changes/Refills   New Medications    METHOCARBAMOL (ROBAXIN) 500 MG TAB    Take 1 tablet (500 mg total) by mouth 4 (four) times daily as needed (pain).   Current Medications    AMLODIPINE (NORVASC) 10 MG TABLET    Take  1 tablet (10 mg total) by mouth once daily.    DICYCLOMINE (BENTYL) 10 MG CAPSULE    Take 1 capsule (10 mg total) by mouth 4 (four) times daily as needed.    ETONOGESTREL (NEXPLANON) 68 MG IMPL    by Subdermal route.    LISINOPRIL (PRINIVIL,ZESTRIL) 5 MG TABLET    Take 1 tablet (5 mg total) by mouth once daily.    MAGNESIUM OXIDE (MAG-OX) 400 MG (241.3 MG MAGNESIUM) TABLET    Take 1 tablet (400 mg total) by mouth 2 (two) times daily.    NALOXONE (NARCAN) 4 MG/ACTUATION SPRY    4mg by nasal route as needed for opioid overdose; may repeat every 2-3 minutes in alternating nostrils until medical help arrives. Call 911    PANTOPRAZOLE (PROTONIX) 40 MG TABLET    Take 1 tablet (40 mg total) by mouth once daily.     Review of patient's allergies indicates:  No Known Allergies    Physical Exam:   Body mass index is 37.07 kg/m².    GENERAL: Well appearing, in no acute distress.  HEAD: Normocephalic and atraumatic.  ENT: External ears and nose grossly normal.  EYES: EOMI bilaterally  PULMONARY: Respirations are grossly even and non-labored.  NEURO: Awake, alert, and oriented x 3.  SKIN: No obvious rashes appreciated.  PSYCH: Mood & affect are appropriate.    Detailed MSK exam:     Right shoulder exam:   -ROM: abduction 80, forward flexion 90, external rotation 50, internal rotation 50  -empty can test guarded, resisted ER guarded, belly press guarded  -zhang test guarded, neers test guarded, whipple test guarded  -biceps load test negative, yerguson test negative, [place]'s test guarded  -sensation intact, pulses 2+  -TTP: anterior, lateral cuff insertion, and posterior    Left shoulder exam:   -ROM: abduction 130, forward flexion 130, external rotation 80, internal rotation 70  -empty can test negative, resisted ER negative, belly press negative  -zhang test negative, neers test negative, whipple test negative  -biceps load test negative, yerguson test negative, [place]'s test negative  -sensation intact, pulses 2+  -TTP:  none      Imaging:  X-Ray Shoulder Complete 2 View Right  Narrative: EXAMINATION:  XR SHOULDER COMPLETE 2 OR MORE VIEWS RIGHT    CLINICAL HISTORY:  XR SHOULDER COMPLETE 2 OR MORE VIEWS RIGHTPain in right shoulder    COMPARISON:  None    FINDINGS:  Three views of the right shoulder were obtained.    No evidence of acute fracture or dislocation.  Bony mineralization is normal.  Soft tissues are unremarkable.   Mild AC joint degenerative changes.  Mild narrowing the rotator interval which could reflect chronic rotator cuff injury.  Impression: No acute fracture or dislocation.    Electronically signed by: Ezekiel Giraldo MD  Date:    04/04/2024  Time:    09:29        Relevant imaging results were reviewed and interpreted by me and per my read shows mild AC arthritic changes and high-riding humeral head concerning for chronic RC tear.  This was discussed with the patient and / or family today.     Assessment:  Carla Naranjo is a 53 y.o. female presenting with chronic right shoulder pain.   History, physical and radiographs are consistent with a likely diagnosis of full thickness RC tear, RC tendinopathy, AC OA.   Plan: MRI ordered. Discussed non-surgical vs surgical options. Discussed that doing steroid injection today would result in needing to wait 3 months if MRI shows anything surgical. Patient understands and still would like to proceed with steroid injection today. Note for work given. Steroid injection given today (see separate procedure note for details). We discussed the proper protocols after the injection such as no submerging pools, baths tubs, or hot tubs for 24 hr.  Showering is okay today.  We also discussed that blood sugars can be elevated after an injection and asked patient to properly checked her sugars over the next few days and contact their PCP if there are any concerns.  We discussed red flags such as fevers, chills, red, warm, tender joint at the area of injection to please seek medical  care immediately.   Continue conservative management for pain.   Follow up after MRI to go over results and determine next steps in management. All questions answered.      Tear of right rotator cuff, unspecified tear extent, unspecified whether traumatic  -     MRI Shoulder Without Contrast Right; Future; Expected date: 04/09/2024  -     Sports Medicine  - Guidance for Needle Placement  -     Large Joint Aspiration/Injection: R subacromial bursa  -     methocarbamoL (ROBAXIN) 500 MG Tab; Take 1 tablet (500 mg total) by mouth 4 (four) times daily as needed (pain).  Dispense: 40 tablet; Refill: 1    Chronic right shoulder pain  -     Ambulatory referral/consult to Orthopedics    Right shoulder pain, unspecified chronicity  -     MRI Shoulder Without Contrast Right; Future; Expected date: 04/09/2024    Tendinopathy of right rotator cuff  -     Large Joint Aspiration/Injection: R subacromial bursa  -     methocarbamoL (ROBAXIN) 500 MG Tab; Take 1 tablet (500 mg total) by mouth 4 (four) times daily as needed (pain).  Dispense: 40 tablet; Refill: 1         Ultrasound guidance was used for needle localization. Images were saved and stored for documentation. The appropriate structures were visualized. Dynamic visualization of the needle was continuous throughout the procedures and maintained good position.      I spent a total of 60 minutes on the day of the visit.  This includes face to face time and non-face to face time preparing to see the patient (eg, review of tests), obtaining and/or reviewing separately obtained history, documenting clinical information in the electronic or other health record, independently interpreting results and communicating results to the patient/family/caregiver, or care coordinator.      A copy of today's visit note has been sent to the referring provider.     Electronically signed:  Jhony Castellanos MD, MPH  04/09/2024  9:46 AM

## 2024-04-09 NOTE — PROCEDURES
Large Joint Aspiration/Injection: R subacromial bursa    Date/Time: 4/9/2024 9:40 AM    Performed by: Jhony Castellanos MD  Authorized by: Jhony Castellanos MD    Consent Done?:  Yes (Verbal)  Indications:  Pain  Site marked: the procedure site was marked    Timeout: prior to procedure the correct patient, procedure, and site was verified    Prep: patient was prepped and draped in usual sterile fashion    Local anesthetic:  Bupivacaine 0.5% without epinephrine and lidocaine 1% without epinephrine    Details:  Needle Size:  21 G  Ultrasonic Guidance for needle placement?: Yes    Images are saved and documented.  Approach:  Lateral  Location:  Shoulder  Site:  R subacromial bursa  Medications:  40 mg triamcinolone acetonide 40 mg/mL  Patient tolerance:  Patient tolerated the procedure well with no immediate complications     Ultrasound guidance was used for needle localization. Images were saved and stored for documentation. The appropriate structures were visualized. Dynamic visualization of the needle was continuous throughout the procedures and maintained good position.

## 2024-04-09 NOTE — PATIENT INSTRUCTIONS
Assessment:  Carla Naranjo is a 53 y.o. female   Chief Complaint   Patient presents with    Right Shoulder - Pain       Encounter Diagnoses   Name Primary?    Right shoulder pain     Right shoulder pain, unspecified chronicity Yes    Tear of right rotator cuff, unspecified tear extent, unspecified whether traumatic         Plan:  Stat MRI for right shoulder  Ultrasound guided subacromial cortisone injection to the right shoulder  We discussed the proper protocols after the injection such as no submerging pools, baths tubs, or hot tubs for 24 hr.  Showering is okay today.  We also discussed that blood sugars can be elevated after an injection and asked patient to properly checked her sugars over the next few days and contact their PCP if there are any concerns.  We discussed red flags such as fevers, chills, red, warm, tender joint at the area of injection to please seek medical care immediately.    Apply topical diclofenac (Voltaren) up to 4 times a day to the affected area.  It can be bought over the counter at any local pharmacy.    Please take Tylenol 1 Gram (2 extra-strength Tylenol tablets) up to 3 times a day.  Please to not take any extra doses of tylenol if you are scheduling in this manner.   A prescription for Methocarbomol 500 mg was placed to be taken 4 times per day as needed for muscle spasm and pain.  Patient may use over the counter lidocaine patches as needed for pain.  Patient may use ice and heat as needed for pain every 2 hours for 15 minutes  Follow up after MRI     Follow-up: after MRI.    Thank you for choosing [facility] and Dr. Jhony Castellanos for your orthopedic & sports medicine care. It is our goal to provide you with exceptional care that will help keep you healthy, active, and get you back in the game.    Please do not hesitate to reach out to us via email, phone, or MyChart with any questions, concerns, or feedback.    If you felt that you received  exemplary care today, please consider leaving us feedback on Health[employer] at:  [email or web address]    If you are experiencing pain/discomfort ,or have questions after 5pm and would like to be connected to the [facility] on-call team, please call this number and specify which Sports Medicine provider is treating you: [phone]

## 2024-04-11 ENCOUNTER — HOSPITAL ENCOUNTER (OUTPATIENT)
Dept: RADIOLOGY | Facility: HOSPITAL | Age: 54
Discharge: HOME | End: 2024-04-11
Attending: STUDENT IN AN ORGANIZED HEALTH CARE EDUCATION/TRAINING PROGRAM
Payer: COMMERCIAL

## 2024-04-11 DIAGNOSIS — M75.101 TEAR OF RIGHT ROTATOR CUFF, UNSPECIFIED TEAR EXTENT, UNSPECIFIED WHETHER TRAUMATIC: ICD-10-CM

## 2024-04-11 DIAGNOSIS — M25.511 RIGHT SHOULDER PAIN, UNSPECIFIED CHRONICITY: ICD-10-CM

## 2024-04-11 PROCEDURE — 73221 MRI JOINT UPR EXTREM W/O DYE: CPT | Mod: 26,RT,, | Performed by: RADIOLOGY

## 2024-04-11 PROCEDURE — 73221 MRI JOINT UPR EXTREM W/O DYE: CPT | Mod: TC,PO,RT

## 2024-04-23 ENCOUNTER — OFFICE VISIT (OUTPATIENT)
Dept: ORTHOPEDICS | Facility: CLINIC | Age: 54
End: 2024-04-23
Payer: COMMERCIAL

## 2024-04-23 VITALS — BODY MASS INDEX: 36.95 KG/M2 | WEIGHT: 243.81 LBS | HEIGHT: 68 IN

## 2024-04-23 DIAGNOSIS — M75.121 COMPLETE TEAR OF RIGHT ROTATOR CUFF, UNSPECIFIED WHETHER TRAUMATIC: Primary | ICD-10-CM

## 2024-04-23 DIAGNOSIS — M19.019 OSTEOARTHRITIS OF ACROMIOCLAVICULAR JOINT: ICD-10-CM

## 2024-04-23 DIAGNOSIS — M75.101 TEAR OF RIGHT ROTATOR CUFF, UNSPECIFIED TEAR EXTENT, UNSPECIFIED WHETHER TRAUMATIC: Primary | ICD-10-CM

## 2024-04-23 PROCEDURE — G2211 COMPLEX E/M VISIT ADD ON: HCPCS | Mod: S$GLB,,, | Performed by: STUDENT IN AN ORGANIZED HEALTH CARE EDUCATION/TRAINING PROGRAM

## 2024-04-23 PROCEDURE — 99214 OFFICE O/P EST MOD 30 MIN: CPT | Mod: S$GLB,,, | Performed by: STUDENT IN AN ORGANIZED HEALTH CARE EDUCATION/TRAINING PROGRAM

## 2024-04-23 PROCEDURE — 3008F BODY MASS INDEX DOCD: CPT | Mod: CPTII,S$GLB,, | Performed by: STUDENT IN AN ORGANIZED HEALTH CARE EDUCATION/TRAINING PROGRAM

## 2024-04-23 PROCEDURE — 1160F RVW MEDS BY RX/DR IN RCRD: CPT | Mod: CPTII,S$GLB,, | Performed by: STUDENT IN AN ORGANIZED HEALTH CARE EDUCATION/TRAINING PROGRAM

## 2024-04-23 PROCEDURE — 1159F MED LIST DOCD IN RCRD: CPT | Mod: CPTII,S$GLB,, | Performed by: STUDENT IN AN ORGANIZED HEALTH CARE EDUCATION/TRAINING PROGRAM

## 2024-04-23 PROCEDURE — 99999 PR PBB SHADOW E&M-EST. PATIENT-LVL III: CPT | Mod: PBBFAC,,, | Performed by: STUDENT IN AN ORGANIZED HEALTH CARE EDUCATION/TRAINING PROGRAM

## 2024-04-23 NOTE — PROGRESS NOTES
Patient ID: Carla Naranjo  YOB: 1970  MRN: [id number]    Chief Complaint: Pain of the Right Shoulder      Referred By: Dex Hewitt MD          History of Present Illness: Carla Naranjo is a right-hand dominant 53 y.o. female who presents today for followup right shoulder, she received CSI on , mild relief she is still complaining of pain after her work day. Pain is moderate, constant, and preventing patient from lifting her arm above her head. She is unable to work right now due to the pain. Denies any trauma, fever, chills. Taking tylenol/ibuprofen. Her pain is some better after taking Methocarbamol at least 3 times a day. She is here to review MRI and discuss treatment options.    2024 Interval History of Present Illness: Carla Naranjo is a right-hand dominant 53 y.o. female who presents today with right shoulder pain over the past 1-2 weeks that is gradually worsening. Pain is moderate, constant, and preventing patient from lifting her arm above her head. She is unable to work right now due to the pain. Denies any trauma, fever, chills. Taking tylenol/ibuprofen. Was seen in ER on 24, was given dexamethasone injection, patient states that this did not provide any relief.     The patient is active in none.  Occupation: [employer]      Past Medical History:   Past Medical History:   Diagnosis Date    DANIELA (acute kidney injury) 2018    Anxiety     Depression     Duodenitis without bleeding 2018    History of opioid abuse     Hypertension     Hyponatremia and hypokalemia 2018    Personality disorder      Past Surgical History:   Procedure Laterality Date     SECTION       Family History   Problem Relation Name Age of Onset    Muscular dystrophy Paternal Grandmother      Suicide Paternal Grandmother      Alcohol abuse Father      Drug abuse Father      No Known Problems Mother      Pneumonia Maternal Grandmother      Alzheimer's  disease Maternal Grandfather      Heart disease Paternal Grandfather       Social History     Socioeconomic History    Marital status:    Tobacco Use    Smoking status: Never    Smokeless tobacco: Never   Substance and Sexual Activity    Alcohol use: Yes     Comment: once every 3 months     Drug use: No    Sexual activity: Yes     Partners: Male     Medication List with Changes/Refills   Current Medications    AMLODIPINE (NORVASC) 10 MG TABLET    Take 1 tablet (10 mg total) by mouth once daily.    DICYCLOMINE (BENTYL) 10 MG CAPSULE    Take 1 capsule (10 mg total) by mouth 4 (four) times daily as needed.    ETONOGESTREL (NEXPLANON) 68 MG IMPL    by Subdermal route.    LISINOPRIL (PRINIVIL,ZESTRIL) 5 MG TABLET    Take 1 tablet (5 mg total) by mouth once daily.    MAGNESIUM OXIDE (MAG-OX) 400 MG (241.3 MG MAGNESIUM) TABLET    Take 1 tablet (400 mg total) by mouth 2 (two) times daily.    METHOCARBAMOL (ROBAXIN) 500 MG TAB    Take 1 tablet (500 mg total) by mouth 4 (four) times daily as needed (pain).    NALOXONE (NARCAN) 4 MG/ACTUATION SPRY    4mg by nasal route as needed for opioid overdose; may repeat every 2-3 minutes in alternating nostrils until medical help arrives. Call 911    PANTOPRAZOLE (PROTONIX) 40 MG TABLET    Take 1 tablet (40 mg total) by mouth once daily.     Review of patient's allergies indicates:  No Known Allergies    Physical Exam:   Body mass index is 37.07 kg/m².    GENERAL: Well appearing, in no acute distress.  HEAD: Normocephalic and atraumatic.  ENT: External ears and nose grossly normal.  EYES: EOMI bilaterally  PULMONARY: Respirations are grossly even and non-labored.  NEURO: Awake, alert, and oriented x 3.  SKIN: No obvious rashes appreciated.  PSYCH: Mood & affect are appropriate.    Detailed MSK exam:     Right shoulder exam:   -ROM: abduction 80, forward flexion 90, external rotation 50, internal rotation 50  -empty can test guarded, resisted ER guarded, belly press  guarded  -zhang test guarded, neers test guarded, whipple test guarded  -biceps load test negative, yerguson test negative, Harlem's test guarded  -sensation intact, pulses 2+  -TTP: anterior, lateral cuff insertion, and posterior    Left shoulder exam:   -ROM: abduction 130, forward flexion 130, external rotation 80, internal rotation 70  -empty can test negative, resisted ER negative, belly press negative  -zhang test negative, neers test negative, whipple test negative  -biceps load test negative, yerguson test negative, Harlem's test negative  -sensation intact, pulses 2+  -TTP: none      Imaging:  MRI Shoulder Without Contrast Right  Narrative: EXAMINATION:  MRI SHOULDER WITHOUT CONTRAST RIGHT    CLINICAL HISTORY:  Shoulder pain, rotator cuff disorder suspected, xray done;  Pain in right shoulder    TECHNIQUE:  Multiplanar/multisequence MRI of the right shoulder was performed without the use of intravenous or intra-articular gadolinium.    COMPARISON:  None.    FINDINGS:  Rotator cuff: Large full-thickness retracted tears of the supraspinatus and infraspinatus tendons.  Free edge of both tendons positioned at the level of the glenohumeral joint.  Chronic tendinosis of the subscapularis tendon.  Teres minor tendon is intact.    Labrum: No large labral defect demonstrated on this non arthrographic study.  Degeneration of anterior superior labrum.    Long head of the biceps: Grossly intact.    Bones: Marrow signal is within normal limits with the exception of cystic/edematous change underlying the rotator cuff footprint.  No evidence of fracture or marrow replacement process.    AC joint: Degenerative capsular hypertrophy.  Type 1 acromion with a centrally flat undersurface.    Cartilage: No large glenohumeral articular cartilage defect demonstrated on this non arthrographic study.    Miscellaneous: Mild volume of contiguous joint and bursal fluid.  Impression: 1. Large full-thickness retracted tears of  supraspinatus and infraspinatus tendons.  2. Degenerative arthropathy of AC joint.  3. Joint and bursal fluid.    Electronically signed by: KACEY Linares MD  Date:    04/11/2024  Time:    09:41        Relevant imaging results were reviewed and interpreted by me and per my read shows mild AC arthritic changes and high-riding humeral head concerning for chronic RC tear.  This was discussed with the patient and / or family today.     Assessment:  Carla Naranjo is a 53 y.o. female presenting with chronic right shoulder pain.   History, physical and radiographs are consistent with a likely diagnosis of full thickness RC tear, RC tendinopathy, AC OA.   Plan: referral to Dr Love for surgical consult. Patient understands surgery would need to wait 3 months after the steroid injection we did last visit. Discussed last visit that doing steroid injection would result in needing to wait 3 months if MRI shows anything surgical and patient understood and still wanted to proceed with steroid injection at that visit. Note for work given. Continue conservative management for pain.   Follow up with Dr Love. All questions answered.      Complete tear of right rotator cuff, unspecified whether traumatic    Osteoarthritis of acromioclavicular joint             Today's visit is associated with current or anticipated ongoing medical care related to this patient's diagnosis of osteoarthritis.  Currently there is no cure of osteoarthritis and the patient will require regular follow up to manage symptoms and progression.  The patient is to return to the office within a minimum of 3-6 months to review symptoms and function at that time.   CPT code         A copy of today's visit note has been sent to the referring provider.     Electronically signed:  Jhony Castellanos MD, MPH  04/23/2024  9:46 AM

## 2024-04-23 NOTE — PATIENT INSTRUCTIONS
Assessment:  Carla Naranjo is a 53 y.o. female   Chief Complaint   Patient presents with    Right Shoulder - Pain       No diagnosis found.     Plan:  Referral to Dr. Love for right RTC    Follow-up: with Dr. Love.    Thank you for choosing [facility] and Dr. Jhony Castellanos for your orthopedic & sports medicine care. It is our goal to provide you with exceptional care that will help keep you healthy, active, and get you back in the game.    Please do not hesitate to reach out to us via email, phone, or MyChart with any questions, concerns, or feedback.    If you felt that you received exemplary care today, please consider leaving us feedback on [employer] at:  [email or web address]    If you are experiencing pain/discomfort ,or have questions after 5pm and would like to be connected to the [facility] on-call team, please call this number and specify which Sports Medicine provider is treating you: [phone]

## 2024-04-23 NOTE — LETTER
April 23, 2024      [facility]  [address]  [facility] [address]  Phone: [phone]       Patient: Carla Naranjo   YOB: 1970  Date of Visit: 04/23/2024    To Whom It May Concern:    Richie Naranjo  was at [facility] on 04/23/2024. The patient may return to work on 04/23/2024 . If you have any questions or concerns, or if I can be of further assistance, please do not hesitate to contact me.    Sincerely,    Jhony Castellanos MD/ Minerva Avendano MA

## 2024-04-30 DIAGNOSIS — M75.101 TEAR OF RIGHT ROTATOR CUFF, UNSPECIFIED TEAR EXTENT, UNSPECIFIED WHETHER TRAUMATIC: ICD-10-CM

## 2024-04-30 DIAGNOSIS — M67.911 TENDINOPATHY OF RIGHT ROTATOR CUFF: ICD-10-CM

## 2024-05-01 RX ORDER — METHOCARBAMOL 500 MG/1
TABLET, FILM COATED ORAL
Qty: 40 TABLET | Refills: 1 | Status: SHIPPED | OUTPATIENT
Start: 2024-05-01 | End: 2024-06-06

## 2024-06-06 ENCOUNTER — OFFICE VISIT (OUTPATIENT)
Dept: SPORTS MEDICINE | Facility: CLINIC | Age: 54
End: 2024-06-06
Payer: COMMERCIAL

## 2024-06-06 ENCOUNTER — TELEPHONE (OUTPATIENT)
Dept: SPORTS MEDICINE | Facility: CLINIC | Age: 54
End: 2024-06-06

## 2024-06-06 VITALS — WEIGHT: 243.81 LBS | HEIGHT: 68 IN | BODY MASS INDEX: 36.95 KG/M2

## 2024-06-06 DIAGNOSIS — M75.121 COMPLETE TEAR OF RIGHT ROTATOR CUFF, UNSPECIFIED WHETHER TRAUMATIC: Primary | ICD-10-CM

## 2024-06-06 DIAGNOSIS — M19.019 OSTEOARTHRITIS OF ACROMIOCLAVICULAR JOINT: ICD-10-CM

## 2024-06-06 PROCEDURE — 1159F MED LIST DOCD IN RCRD: CPT | Mod: CPTII,S$GLB,, | Performed by: STUDENT IN AN ORGANIZED HEALTH CARE EDUCATION/TRAINING PROGRAM

## 2024-06-06 PROCEDURE — G2211 COMPLEX E/M VISIT ADD ON: HCPCS | Mod: S$GLB,,, | Performed by: STUDENT IN AN ORGANIZED HEALTH CARE EDUCATION/TRAINING PROGRAM

## 2024-06-06 PROCEDURE — 3008F BODY MASS INDEX DOCD: CPT | Mod: CPTII,S$GLB,, | Performed by: STUDENT IN AN ORGANIZED HEALTH CARE EDUCATION/TRAINING PROGRAM

## 2024-06-06 PROCEDURE — 99214 OFFICE O/P EST MOD 30 MIN: CPT | Mod: S$GLB,,, | Performed by: STUDENT IN AN ORGANIZED HEALTH CARE EDUCATION/TRAINING PROGRAM

## 2024-06-06 PROCEDURE — 99999 PR PBB SHADOW E&M-EST. PATIENT-LVL IV: CPT | Mod: PBBFAC,,, | Performed by: STUDENT IN AN ORGANIZED HEALTH CARE EDUCATION/TRAINING PROGRAM

## 2024-06-06 PROCEDURE — 1160F RVW MEDS BY RX/DR IN RCRD: CPT | Mod: CPTII,S$GLB,, | Performed by: STUDENT IN AN ORGANIZED HEALTH CARE EDUCATION/TRAINING PROGRAM

## 2024-06-06 RX ORDER — CYCLOBENZAPRINE HCL 10 MG
10 TABLET ORAL NIGHTLY
Qty: 30 TABLET | Refills: 1 | Status: SHIPPED | OUTPATIENT
Start: 2024-06-06 | End: 2024-08-05

## 2024-06-06 NOTE — LETTER
June 6, 2024      [facility]  [address] [facility] [address] [address]  Phone: [phone]  Fax: [phone]       Patient: Carla Naranjo   YOB: 1970  Date of Visit: 06/06/2024    To Whom It May Concern:    Richie Naranjo  was at [facility] on 06/06/2024. The patient may return to work on 06/07/2024 with restrictions. Patient should minimize lifting while at work.  If you have any questions or concerns, or if I can be of further assistance, please do not hesitate to contact me.    Sincerely,    Jhony Castellanos MD

## 2024-06-06 NOTE — PROGRESS NOTES
Patient ID: Carla Naranjo  YOB: 1970  MRN: [id number]    Chief Complaint: Pain of the Right Shoulder      History of Present Illness: Carla Naranjo is a right-hand dominant 53 y.o. female who presents today with right shoulder pain. Her LOV with CSI 24, she report relief lasted until 2 weeks ago. She c/o 8/10 achy, throbbing and constant pain today. She report the pain does interfere with her sleep at night. She report lifting and certain and movements makes pain worse. She reports the right shoulder is swollen and tightening up. She uses OTC cream and epsom salt soak for pain. She was prescribed muscle relaxer's for pain. She previously had a MRI done on 24. She was referred to Dr. Love for surgical consult but she is interested in options before surgery.      Occupation:  at [employer]      Past Medical History:   Past Medical History:   Diagnosis Date    DANIELA (acute kidney injury) 2018    Anxiety     Depression     Duodenitis without bleeding 2018    History of opioid abuse     Hypertension     Hyponatremia and hypokalemia 2018    Personality disorder      Past Surgical History:   Procedure Laterality Date     SECTION       Family History   Problem Relation Name Age of Onset    Muscular dystrophy Paternal Grandmother      Suicide Paternal Grandmother      Alcohol abuse Father      Drug abuse Father      No Known Problems Mother      Pneumonia Maternal Grandmother      Alzheimer's disease Maternal Grandfather      Heart disease Paternal Grandfather       Social History     Socioeconomic History    Marital status:    Tobacco Use    Smoking status: Never    Smokeless tobacco: Never   Substance and Sexual Activity    Alcohol use: Yes     Comment: once every 3 months     Drug use: No    Sexual activity: Yes     Partners: Male     Medication List with Changes/Refills   New Medications    CYCLOBENZAPRINE (FLEXERIL) 10 MG TABLET     Take 1 tablet (10 mg total) by mouth every evening.   Current Medications    AMLODIPINE (NORVASC) 10 MG TABLET    Take 1 tablet (10 mg total) by mouth once daily.    DICYCLOMINE (BENTYL) 10 MG CAPSULE    Take 1 capsule (10 mg total) by mouth 4 (four) times daily as needed.    ETONOGESTREL (NEXPLANON) 68 MG IMPL    by Subdermal route.    LISINOPRIL (PRINIVIL,ZESTRIL) 5 MG TABLET    Take 1 tablet (5 mg total) by mouth once daily.    MAGNESIUM OXIDE (MAG-OX) 400 MG (241.3 MG MAGNESIUM) TABLET    Take 1 tablet (400 mg total) by mouth 2 (two) times daily.    NALOXONE (NARCAN) 4 MG/ACTUATION SPRY    4mg by nasal route as needed for opioid overdose; may repeat every 2-3 minutes in alternating nostrils until medical help arrives. Call 911    PANTOPRAZOLE (PROTONIX) 40 MG TABLET    Take 1 tablet (40 mg total) by mouth once daily.   Discontinued Medications    METHOCARBAMOL (ROBAXIN) 500 MG TAB    TAKE 1 TABLET (500 MG TOTAL) BY MOUTH 4 TIMES DAILY AS NEEDED FOR PAIN.     Review of patient's allergies indicates:  No Known Allergies    Physical Exam:   Body mass index is 37.07 kg/m².    GENERAL: Well appearing, in no acute distress.  HEAD: Normocephalic and atraumatic.  ENT: External ears and nose grossly normal.  EYES: EOMI bilaterally  PULMONARY: Respirations are grossly even and non-labored.  NEURO: Awake, alert, and oriented x 3.  SKIN: No obvious rashes appreciated.  PSYCH: Mood & affect are appropriate.    Detailed MSK exam:     Right shoulder exam:   -ROM: abduction 80, forward flexion 90, external rotation 50, internal rotation 50  -empty can test positive, resisted ER positive, belly press pain but no weakness  -zhang test guarded, neers test guarded, whipple test guarded  -biceps load test negative, yerguson test negative, Darke's test guarded  -sensation intact, pulses 2+  -TTP: anterior, lateral cuff insertion, and posterior        Imaging:  MRI Shoulder Without Contrast Right  Narrative: EXAMINATION:  MRI  SHOULDER WITHOUT CONTRAST RIGHT    CLINICAL HISTORY:  Shoulder pain, rotator cuff disorder suspected, xray done;  Pain in right shoulder    TECHNIQUE:  Multiplanar/multisequence MRI of the right shoulder was performed without the use of intravenous or intra-articular gadolinium.    COMPARISON:  None.    FINDINGS:  Rotator cuff: Large full-thickness retracted tears of the supraspinatus and infraspinatus tendons.  Free edge of both tendons positioned at the level of the glenohumeral joint.  Chronic tendinosis of the subscapularis tendon.  Teres minor tendon is intact.    Labrum: No large labral defect demonstrated on this non arthrographic study.  Degeneration of anterior superior labrum.    Long head of the biceps: Grossly intact.    Bones: Marrow signal is within normal limits with the exception of cystic/edematous change underlying the rotator cuff footprint.  No evidence of fracture or marrow replacement process.    AC joint: Degenerative capsular hypertrophy.  Type 1 acromion with a centrally flat undersurface.    Cartilage: No large glenohumeral articular cartilage defect demonstrated on this non arthrographic study.    Miscellaneous: Mild volume of contiguous joint and bursal fluid.  Impression: 1. Large full-thickness retracted tears of supraspinatus and infraspinatus tendons.  2. Degenerative arthropathy of AC joint.  3. Joint and bursal fluid.    Electronically signed by: KACEY Linares MD  Date:    04/11/2024  Time:    09:41        Relevant imaging results were reviewed and interpreted by me and per my read shows large full thickness RC tears and AC arthritic changes on MRI.  This was discussed with the patient and / or family today.     Assessment:  Carla Naranjo is a 53 y.o. female following up for right shoulder pain and full thickness RC tear.   Plan: not interested in doing surgery at this point. Too soon to repeat steroid injection today but can repeat in one month. Ice, voltaren gel,  flexeril as needed. PT in [place]. Discussed risks and complications of not treating these full thickness tears without surgery and she understands.   Follow up 4 weeks. All questions answered.     Complete tear of right rotator cuff, unspecified whether traumatic  -     Ambulatory referral/consult to Physical/Occupational Therapy; Future; Expected date: 06/13/2024  -     cyclobenzaprine (FLEXERIL) 10 MG tablet; Take 1 tablet (10 mg total) by mouth every evening.  Dispense: 30 tablet; Refill: 1    Osteoarthritis of acromioclavicular joint  -     Ambulatory referral/consult to Physical/Occupational Therapy; Future; Expected date: 06/13/2024         Today's visit is associated with current or anticipated ongoing medical care related to this patient's diagnosis of osteoarthritis.  Currently there is no cure of osteoarthritis and the patient will require regular follow up to manage symptoms and progression.  The patient is to return to the office within a minimum of 3-6 months to review symptoms and function at that time.   CPT code      Electronically signed:  Jhony Castellanos MD, MPH  06/06/2024  11:42 AM

## 2024-06-06 NOTE — PATIENT INSTRUCTIONS
Assessment:  Carla Naranjo is a 53 y.o. female   Chief Complaint   Patient presents with    Right Shoulder - Pain       Encounter Diagnosis   Name Primary?    Complete tear of right rotator cuff, unspecified whether traumatic Yes        Plan:  Referral to physical therapy at [facility] in [place]  Apply topical diclofenac (Voltaren) up to 4 times a day to the affected area.  It can be bought over the counter at any local pharmacy.    Please take Tylenol 1 Gram (2 extra-strength Tylenol tablets) up to 3 times a day.  Please to not take any extra doses of tylenol if you are scheduling in this manner.   Prescription for Flexeril to be taken as directed.  Patient may ice every 2 hours for 15 minutes as needed to control pain and swelling.   Follow up in 4 weeks in [facility]          Follow-up: 4 weeks or sooner if there are problems between now and then.    Thank you for choosing [facility] and Dr. Jhony Castellanos for your orthopedic & sports medicine care. It is our goal to provide you with exceptional care that will help keep you healthy, active, and get you back in the game.    Please do not hesitate to reach out to us via email, phone, or MyChart with any questions, concerns, or feedback.    If you felt that you received exemplary care today, please consider leaving us feedback on [employer] at:  [email or web address]    If you are experiencing pain/discomfort ,or have questions after 5pm and would like to be connected to the [facility] on-call team, please call this number and specify which Sports Medicine provider is treating you: [phone]

## 2024-06-12 DIAGNOSIS — M75.121 COMPLETE TEAR OF RIGHT ROTATOR CUFF, UNSPECIFIED WHETHER TRAUMATIC: Primary | ICD-10-CM

## 2024-06-12 DIAGNOSIS — M19.019 OSTEOARTHRITIS OF ACROMIOCLAVICULAR JOINT: ICD-10-CM

## 2024-06-12 RX ORDER — MELOXICAM 15 MG/1
15 TABLET ORAL DAILY
Qty: 30 TABLET | Refills: 1 | Status: SHIPPED | OUTPATIENT
Start: 2024-06-12

## 2024-07-09 ENCOUNTER — OFFICE VISIT (OUTPATIENT)
Dept: ORTHOPEDICS | Facility: CLINIC | Age: 54
End: 2024-07-09
Payer: COMMERCIAL

## 2024-07-09 VITALS — HEIGHT: 68 IN | BODY MASS INDEX: 36.95 KG/M2 | WEIGHT: 243.81 LBS

## 2024-07-09 DIAGNOSIS — M75.101 TEAR OF RIGHT ROTATOR CUFF, UNSPECIFIED TEAR EXTENT, UNSPECIFIED WHETHER TRAUMATIC: Primary | ICD-10-CM

## 2024-07-09 DIAGNOSIS — M25.511 CHRONIC RIGHT SHOULDER PAIN: ICD-10-CM

## 2024-07-09 DIAGNOSIS — G89.29 CHRONIC RIGHT SHOULDER PAIN: ICD-10-CM

## 2024-07-09 DIAGNOSIS — M19.019 OSTEOARTHRITIS OF ACROMIOCLAVICULAR JOINT: ICD-10-CM

## 2024-07-09 PROCEDURE — 99999 PR PBB SHADOW E&M-EST. PATIENT-LVL III: CPT | Mod: PBBFAC,,, | Performed by: STUDENT IN AN ORGANIZED HEALTH CARE EDUCATION/TRAINING PROGRAM

## 2024-07-09 RX ORDER — TRIAMCINOLONE ACETONIDE 40 MG/ML
40 INJECTION, SUSPENSION INTRA-ARTICULAR; INTRAMUSCULAR
Status: DISCONTINUED | OUTPATIENT
Start: 2024-07-09 | End: 2024-07-09 | Stop reason: HOSPADM

## 2024-07-09 RX ADMIN — TRIAMCINOLONE ACETONIDE 40 MG: 40 INJECTION, SUSPENSION INTRA-ARTICULAR; INTRAMUSCULAR at 10:07

## 2024-07-09 NOTE — PATIENT INSTRUCTIONS
Assessment:  Carla Naranjo is a 53 y.o. female   Chief Complaint   Patient presents with    Right Shoulder - Pain    Follow-up       No diagnosis found.     Plan:  Ultrasound guided subacromial cortisone injection to the right shoulder  We discussed the proper protocols after the injection such as no submerging pools, baths tubs, or hot tubs for 24 hr.  Showering is okay today.  We also discussed that blood sugars can be elevated after an injection and asked patient to properly checked her sugars over the next few days and contact their PCP if there are any concerns.  We discussed red flags such as fevers, chills, red, warm, tender joint at the area of injection to please seek medical care immediately.    Follow up as needed    Follow-up: as needed.    Thank you for choosing [facility] and Dr. Jhony Castellanos for your orthopedic & sports medicine care. It is our goal to provide you with exceptional care that will help keep you healthy, active, and get you back in the game.    Please do not hesitate to reach out to us via email, phone, or MyChart with any questions, concerns, or feedback.    If you felt that you received exemplary care today, please consider leaving us feedback on [employer] at:  [email or web address]    If you are experiencing pain/discomfort ,or have questions after 5pm and would like to be connected to the [facility] on-call team, please call this number and specify which Sports Medicine provider is treating you: [phone]

## 2024-07-09 NOTE — PROCEDURES
Sports Medicine US - Guidance for Needle Placement    Date/Time: 7/9/2024 10:40 AM    Performed by: Jhony Castellanos MD  Authorized by: Jhony Castellanos MD  Preparation: Patient was prepped and draped in the usual sterile fashion.  Local anesthesia used: no    Anesthesia:  Local anesthesia used: no    Sedation:  Patient sedated: no    Patient tolerance: patient tolerated the procedure well with no immediate complications  Comments: Ultrasound guidance was used for needle localization. Images were saved and stored for documentation. The appropriate structures were visualized. Dynamic visualization of the needle was continuous throughout the procedures and maintained good position.

## 2024-07-09 NOTE — PROGRESS NOTES
Patient ID: Carla Naranjo  YOB: 1970  MRN: [id number]    Chief Complaint: Pain of the Right Shoulder and Follow-up        History of Present Illness: Carla Naranjo is a right-hand dominant 53 y.o. female who presents today for followup right shoulder, she received CSI on , mild relief she is still complaining of pain after her work day. He is working at [employer]. Pain is moderate, constant, and preventing patient from lifting her arm above her head. Denies any trauma, fever, chills. Taking tylenol/ibuprofen. Her pain is some better after taking Methocarbamol at least 3 times a day. Referral sent to PT, she has not tried Physical therapy. She is here to try another CSI    2024 Interval History of Present Illness: Carla Naranjo is a right-hand dominant 53 y.o. female who presents today with right shoulder pain. Her LOV with CSI 24, she report relief lasted until 2 weeks ago. She c/o 8/10 achy, throbbing and constant pain today. She report the pain does interfere with her sleep at night. She report lifting and certain and movements makes pain worse. She reports the right shoulder is swollen and tightening up. She uses OTC cream and epsom salt soak for pain. She was prescribed muscle relaxer's for pain. She previously had a MRI done on 24. She was referred to Dr. Love for surgical consult but she is interested in options before surgery.      Occupation:  at [employer]      Past Medical History:   Past Medical History:   Diagnosis Date    DANIELA (acute kidney injury) 2018    Anxiety     Depression     Duodenitis without bleeding 2018    History of opioid abuse     Hypertension     Hyponatremia and hypokalemia 2018    Personality disorder      Past Surgical History:   Procedure Laterality Date     SECTION       Family History   Problem Relation Name Age of Onset    Muscular dystrophy Paternal Grandmother      Suicide  Paternal Grandmother      Alcohol abuse Father      Drug abuse Father      No Known Problems Mother      Pneumonia Maternal Grandmother      Alzheimer's disease Maternal Grandfather      Heart disease Paternal Grandfather       Social History     Socioeconomic History    Marital status:    Tobacco Use    Smoking status: Never    Smokeless tobacco: Never   Substance and Sexual Activity    Alcohol use: Yes     Comment: once every 3 months     Drug use: No    Sexual activity: Yes     Partners: Male     Medication List with Changes/Refills   Current Medications    AMLODIPINE (NORVASC) 10 MG TABLET    Take 1 tablet (10 mg total) by mouth once daily.    CYCLOBENZAPRINE (FLEXERIL) 10 MG TABLET    Take 1 tablet (10 mg total) by mouth every evening.    DICYCLOMINE (BENTYL) 10 MG CAPSULE    Take 1 capsule (10 mg total) by mouth 4 (four) times daily as needed.    ETONOGESTREL (NEXPLANON) 68 MG IMPL    by Subdermal route.    LISINOPRIL (PRINIVIL,ZESTRIL) 5 MG TABLET    Take 1 tablet (5 mg total) by mouth once daily.    MAGNESIUM OXIDE (MAG-OX) 400 MG (241.3 MG MAGNESIUM) TABLET    Take 1 tablet (400 mg total) by mouth 2 (two) times daily.    MELOXICAM (MOBIC) 15 MG TABLET    Take 1 tablet (15 mg total) by mouth once daily. Take 1 tab daily for 14 days, then daily as needed.    NALOXONE (NARCAN) 4 MG/ACTUATION SPRY    4mg by nasal route as needed for opioid overdose; may repeat every 2-3 minutes in alternating nostrils until medical help arrives. Call 911    PANTOPRAZOLE (PROTONIX) 40 MG TABLET    Take 1 tablet (40 mg total) by mouth once daily.     Review of patient's allergies indicates:  No Known Allergies    Physical Exam:   Body mass index is 37.07 kg/m².    GENERAL: Well appearing, in no acute distress.  HEAD: Normocephalic and atraumatic.  ENT: External ears and nose grossly normal.  EYES: EOMI bilaterally  PULMONARY: Respirations are grossly even and non-labored.  NEURO: Awake, alert, and oriented x 3.  SKIN: No  obvious rashes appreciated.  PSYCH: Mood & affect are appropriate.    Detailed MSK exam:     Right shoulder exam:   -ROM: abduction 80, forward flexion 90, external rotation 50, internal rotation 50  -empty can test positive, resisted ER positive, belly press pain but no weakness  -zhang test guarded, neers test guarded, whipple test guarded  -biceps load test negative, yerguson test negative, CataÃ±o's test guarded  -sensation intact, pulses 2+  -TTP: anterior, lateral cuff insertion, and posterior        Imaging:  MRI Shoulder Without Contrast Right  Narrative: EXAMINATION:  MRI SHOULDER WITHOUT CONTRAST RIGHT    CLINICAL HISTORY:  Shoulder pain, rotator cuff disorder suspected, xray done;  Pain in right shoulder    TECHNIQUE:  Multiplanar/multisequence MRI of the right shoulder was performed without the use of intravenous or intra-articular gadolinium.    COMPARISON:  None.    FINDINGS:  Rotator cuff: Large full-thickness retracted tears of the supraspinatus and infraspinatus tendons.  Free edge of both tendons positioned at the level of the glenohumeral joint.  Chronic tendinosis of the subscapularis tendon.  Teres minor tendon is intact.    Labrum: No large labral defect demonstrated on this non arthrographic study.  Degeneration of anterior superior labrum.    Long head of the biceps: Grossly intact.    Bones: Marrow signal is within normal limits with the exception of cystic/edematous change underlying the rotator cuff footprint.  No evidence of fracture or marrow replacement process.    AC joint: Degenerative capsular hypertrophy.  Type 1 acromion with a centrally flat undersurface.    Cartilage: No large glenohumeral articular cartilage defect demonstrated on this non arthrographic study.    Miscellaneous: Mild volume of contiguous joint and bursal fluid.  Impression: 1. Large full-thickness retracted tears of supraspinatus and infraspinatus tendons.  2. Degenerative arthropathy of AC joint.  3. Joint and  bursal fluid.    Electronically signed by: KACEY Linares MD  Date:    04/11/2024  Time:    09:41        Relevant imaging results were reviewed and interpreted by me and per my read shows large full thickness RC tears and AC arthritic changes on MRI.  This was discussed with the patient and / or family today.     Assessment:  Carla Naranjo is a 53 y.o. female following up for right shoulder pain and full thickness RC tear.   Plan: Steroid injection given today (see separate procedure note for details). We discussed the proper protocols after the injection such as no submerging pools, baths tubs, or hot tubs for 24 hr.  Showering is okay today.  We also discussed that blood sugars can be elevated after an injection and asked patient to properly checked her sugars over the next few days and contact their PCP if there are any concerns.  We discussed red flags such as fevers, chills, red, warm, tender joint at the area of injection to please seek medical care immediately.   not interested in doing surgery at this point. Ice, voltaren gel, flexeril as needed. PT in [place]. Discussed risks and complications of not treating these full thickness tears without surgery and she understands.   Follow up as needed. All questions answered.     Tear of right rotator cuff, unspecified tear extent, unspecified whether traumatic  -     Sports Medicine US - Guidance for Needle Placement  -     Large Joint Aspiration/Injection: R subacromial bursa    Osteoarthritis of acromioclavicular joint  -     Sports Medicine US - Guidance for Needle Placement  -     Large Joint Aspiration/Injection: R subacromial bursa    Chronic right shoulder pain  -     Sports Medicine US - Guidance for Needle Placement  -     Large Joint Aspiration/Injection: R subacromial bursa         Ultrasound guidance was used for needle localization. Images were saved and stored for documentation. The appropriate structures were visualized. Dynamic  visualization of the needle was continuous throughout the procedures and maintained good position.       Electronically signed:  Jhony Castellanos MD, MPH  07/09/2024  11:42 AM

## 2024-07-09 NOTE — PROCEDURES
Large Joint Aspiration/Injection: R subacromial bursa    Date/Time: 7/9/2024 10:40 AM    Performed by: Jhony Castellanos MD  Authorized by: Jhony Castellanos MD    Consent Done?:  Yes (Verbal)  Indications:  Pain  Site marked: the procedure site was marked    Timeout: prior to procedure the correct patient, procedure, and site was verified    Prep: patient was prepped and draped in usual sterile fashion    Local anesthetic:  Bupivacaine 0.5% without epinephrine and lidocaine 1% without epinephrine    Details:  Needle Size:  21 G  Ultrasonic Guidance for needle placement?: Yes    Images are saved and documented.  Approach:  Lateral  Location:  Shoulder  Site:  R subacromial bursa  Medications:  40 mg triamcinolone acetonide 40 mg/mL  Patient tolerance:  Patient tolerated the procedure well with no immediate complications     Ultrasound guidance was used for needle localization. Images were saved and stored for documentation. The appropriate structures were visualized. Dynamic visualization of the needle was continuous throughout the procedures and maintained good position.

## 2024-07-30 DIAGNOSIS — M75.121 COMPLETE TEAR OF RIGHT ROTATOR CUFF, UNSPECIFIED WHETHER TRAUMATIC: ICD-10-CM

## 2024-07-30 RX ORDER — CYCLOBENZAPRINE HCL 10 MG
10 TABLET ORAL NIGHTLY
Qty: 30 TABLET | Refills: 1 | Status: SHIPPED | OUTPATIENT
Start: 2024-07-30 | End: 2024-09-28

## 2024-08-06 DIAGNOSIS — M19.019 OSTEOARTHRITIS OF ACROMIOCLAVICULAR JOINT: ICD-10-CM

## 2024-08-06 DIAGNOSIS — M75.121 COMPLETE TEAR OF RIGHT ROTATOR CUFF, UNSPECIFIED WHETHER TRAUMATIC: ICD-10-CM

## 2024-08-06 RX ORDER — MELOXICAM 15 MG/1
15 TABLET ORAL DAILY
Qty: 30 TABLET | Refills: 1 | Status: SHIPPED | OUTPATIENT
Start: 2024-08-06

## 2024-09-20 DIAGNOSIS — M75.121 COMPLETE TEAR OF RIGHT ROTATOR CUFF, UNSPECIFIED WHETHER TRAUMATIC: ICD-10-CM

## 2024-09-20 RX ORDER — CYCLOBENZAPRINE HCL 10 MG
10 TABLET ORAL NIGHTLY
Qty: 30 TABLET | Refills: 1 | Status: SHIPPED | OUTPATIENT
Start: 2024-09-20 | End: 2024-11-19

## 2024-10-05 DIAGNOSIS — M19.019 OSTEOARTHRITIS OF ACROMIOCLAVICULAR JOINT: ICD-10-CM

## 2024-10-05 DIAGNOSIS — M75.121 COMPLETE TEAR OF RIGHT ROTATOR CUFF, UNSPECIFIED WHETHER TRAUMATIC: ICD-10-CM

## 2024-10-07 RX ORDER — MELOXICAM 15 MG/1
TABLET ORAL
Qty: 30 TABLET | Refills: 1 | Status: SHIPPED | OUTPATIENT
Start: 2024-10-07

## 2024-11-15 DIAGNOSIS — M75.121 COMPLETE TEAR OF RIGHT ROTATOR CUFF, UNSPECIFIED WHETHER TRAUMATIC: ICD-10-CM

## 2024-11-15 RX ORDER — CYCLOBENZAPRINE HCL 10 MG
10 TABLET ORAL NIGHTLY
Qty: 30 TABLET | Refills: 1 | Status: SHIPPED | OUTPATIENT
Start: 2024-11-15 | End: 2025-01-14

## 2024-11-26 ENCOUNTER — OFFICE VISIT (OUTPATIENT)
Dept: ORTHOPEDICS | Facility: CLINIC | Age: 54
End: 2024-11-26
Payer: COMMERCIAL

## 2024-11-26 VITALS — HEIGHT: 68 IN | WEIGHT: 212.31 LBS | BODY MASS INDEX: 32.18 KG/M2

## 2024-11-26 DIAGNOSIS — M75.121 COMPLETE TEAR OF RIGHT ROTATOR CUFF, UNSPECIFIED WHETHER TRAUMATIC: Primary | ICD-10-CM

## 2024-11-26 PROCEDURE — 99999 PR PBB SHADOW E&M-EST. PATIENT-LVL III: CPT | Mod: PBBFAC,,, | Performed by: STUDENT IN AN ORGANIZED HEALTH CARE EDUCATION/TRAINING PROGRAM

## 2024-11-26 RX ORDER — TRIAMCINOLONE ACETONIDE 40 MG/ML
40 INJECTION, SUSPENSION INTRA-ARTICULAR; INTRAMUSCULAR
Status: DISCONTINUED | OUTPATIENT
Start: 2024-11-26 | End: 2024-11-26 | Stop reason: HOSPADM

## 2024-11-26 RX ADMIN — TRIAMCINOLONE ACETONIDE 40 MG: 40 INJECTION, SUSPENSION INTRA-ARTICULAR; INTRAMUSCULAR at 10:11

## 2024-11-26 NOTE — PROCEDURES
Sports Medicine US - Guidance for Needle Placement    Date/Time: 11/26/2024 10:00 AM    Performed by: Jhony Castellanos MD  Authorized by: Jhony Castellanos MD  Preparation: Patient was prepped and draped in the usual sterile fashion.  Local anesthesia used: no    Anesthesia:  Local anesthesia used: no    Sedation:  Patient sedated: no    Patient tolerance: patient tolerated the procedure well with no immediate complications  Comments: Ultrasound guidance was used for needle localization. Images were saved and stored for documentation. The appropriate structures were visualized. Dynamic visualization of the needle was continuous throughout the procedures and maintained good position.

## 2024-11-26 NOTE — PROCEDURES
Large Joint Aspiration/Injection: R subacromial bursa    Date/Time: 11/26/2024 10:00 AM    Performed by: Jhony Castellanos MD  Authorized by: Jhony Castellanos MD    Consent Done?:  Yes (Verbal)  Indications:  Pain  Site marked: the procedure site was marked    Timeout: prior to procedure the correct patient, procedure, and site was verified    Prep: patient was prepped and draped in usual sterile fashion    Local anesthetic:  Bupivacaine 0.5% without epinephrine and lidocaine 1% without epinephrine    Details:  Needle Size:  21 G  Ultrasonic Guidance for needle placement?: Yes    Images are saved and documented.  Approach:  Lateral  Location:  Shoulder  Site:  R subacromial bursa  Medications:  40 mg triamcinolone acetonide 40 mg/mL  Patient tolerance:  Patient tolerated the procedure well with no immediate complications     Ultrasound guidance was used for needle localization. Images were saved and stored for documentation. The appropriate structures were visualized. Dynamic visualization of the needle was continuous throughout the procedures and maintained good position.

## 2024-11-26 NOTE — PATIENT INSTRUCTIONS
Assessment:  Carla Naranjo is a 53 y.o. female   Chief Complaint   Patient presents with    Right Shoulder - Pain       No diagnosis found.     Plan:  Ultrasound guided subacromial cortisone injection to the right shoulder  We discussed the proper protocols after the injection such as no submerging pools, baths tubs, or hot tubs for 24 hr.  Showering is okay today.  We also discussed that blood sugars can be elevated after an injection and asked patient to properly checked her sugars over the next few days and contact their PCP if there are any concerns.  We discussed red flags such as fevers, chills, red, warm, tender joint at the area of injection to please seek medical care immediately.    Follow up as needed    Follow-up: as needed.    Thank you for choosing [facility] and Dr. Jhony Castellanos for your orthopedic & sports medicine care. It is our goal to provide you with exceptional care that will help keep you healthy, active, and get you back in the game.    Please do not hesitate to reach out to us via email, phone, or MyChart with any questions, concerns, or feedback.    If you felt that you received exemplary care today, please consider leaving us feedback on [email or web address] at:  [email or web address]    If you are experiencing pain/discomfort ,or have questions after 5pm and would like to be connected to the [facility] on-call team, please call this number and specify which Sports Medicine provider is treating you: [phone]

## 2024-11-26 NOTE — PROGRESS NOTES
Patient ID: Carla Naranjo  YOB: 1970  MRN: [id number]    Chief Complaint: Pain of the Right Shoulder      History of Present Illness: Carla Naranjo is a right-hand dominant 53 y.o. female who presents today with right shoulder pain.  Last seen in clinic on 7/9/2024 where she received a subacromial cortisone injection to the right shoulder.  Rates her pain today at a 4/10.  Pain is greatest at night, when trying to get comfortable, and while at work.  Reports that small actions of making change and lifting objects increasing pain at work.  Believes that injection was helpful and interested in repeating.  Currently using heat, Voltaren gel, Meloxicam and Flexeril for symptoms.  Unable to ice due to an increase in pain.    7/9/2024 Interval History of Present Illness: Carla Naranjo is a right-hand dominant 53 y.o. female who presents today for followup right shoulder, she received CSI on 4/09, mild relief she is still complaining of pain after her work day. He is working at [employer]. Pain is moderate, constant, and preventing patient from lifting her arm above her head. Denies any trauma, fever, chills. Taking tylenol/ibuprofen. Her pain is some better after taking Methocarbamol at least 3 times a day. Referral sent to PT, she has not tried Physical therapy. She is here to try another CSI    06/06/2024 Interval History of Present Illness: Carla Naranjo is a right-hand dominant 53 y.o. female who presents today with right shoulder pain. Her LOV with CSI 04/09/24, she report relief lasted until 2 weeks ago. She c/o 8/10 achy, throbbing and constant pain today. She report the pain does interfere with her sleep at night. She report lifting and certain and movements makes pain worse. She reports the right shoulder is swollen and tightening up. She uses OTC cream and epsom salt soak for pain. She was prescribed muscle relaxer's for pain. She previously had a MRI done on  24. She was referred to Dr. Love for surgical consult but she is interested in options before surgery.     The patient is active in none.  Occupation: [employer]       Past Medical History:   Past Medical History:   Diagnosis Date    DANIELA (acute kidney injury) 2018    Anxiety     Depression     Duodenitis without bleeding 2018    History of opioid abuse     Hypertension     Hyponatremia and hypokalemia 2018    Personality disorder      Past Surgical History:   Procedure Laterality Date     SECTION       Family History   Problem Relation Name Age of Onset    Muscular dystrophy Paternal Grandmother      Suicide Paternal Grandmother      Alcohol abuse Father      Drug abuse Father      No Known Problems Mother      Pneumonia Maternal Grandmother      Alzheimer's disease Maternal Grandfather      Heart disease Paternal Grandfather       Social History     Socioeconomic History    Marital status:    Tobacco Use    Smoking status: Never    Smokeless tobacco: Never   Substance and Sexual Activity    Alcohol use: Yes     Comment: once every 3 months     Drug use: No    Sexual activity: Yes     Partners: Male     Medication List with Changes/Refills   Current Medications    AMLODIPINE (NORVASC) 10 MG TABLET    Take 1 tablet (10 mg total) by mouth once daily.    CYCLOBENZAPRINE (FLEXERIL) 10 MG TABLET    Take 1 tablet (10 mg total) by mouth every evening.    DICYCLOMINE (BENTYL) 10 MG CAPSULE    Take 1 capsule (10 mg total) by mouth 4 (four) times daily as needed.    ETONOGESTREL (NEXPLANON) 68 MG IMPL    by Subdermal route.    LISINOPRIL (PRINIVIL,ZESTRIL) 5 MG TABLET    Take 1 tablet (5 mg total) by mouth once daily.    MAGNESIUM OXIDE (MAG-OX) 400 MG (241.3 MG MAGNESIUM) TABLET    Take 1 tablet (400 mg total) by mouth 2 (two) times daily.    MELOXICAM (MOBIC) 15 MG TABLET    TAKE 1 TABLET (15 MG TOTAL) BY MOUTH ONCE DAILY FOR 14 DAYS, THEN DAILY AS NEEDED.    NALOXONE (NARCAN) 4  MG/ACTUATION SPRY    4mg by nasal route as needed for opioid overdose; may repeat every 2-3 minutes in alternating nostrils until medical help arrives. Call 911    PANTOPRAZOLE (PROTONIX) 40 MG TABLET    Take 1 tablet (40 mg total) by mouth once daily.     Review of patient's allergies indicates:  No Known Allergies    Physical Exam:   Body mass index is 32.28 kg/m².    GENERAL: Well appearing, in no acute distress.  HEAD: Normocephalic and atraumatic.  ENT: External ears and nose grossly normal.  EYES: EOMI bilaterally  PULMONARY: Respirations are grossly even and non-labored.  NEURO: Awake, alert, and oriented x 3.  SKIN: No obvious rashes appreciated.  PSYCH: Mood & affect are appropriate.    Detailed MSK exam:     Right shoulder exam:   -ROM: abduction 80, forward flexion 90, external rotation 50, internal rotation 50  -empty can test positive, resisted ER positive, belly press pain but no weakness  -zhang test guarded, neers test guarded, whipple test guarded  -biceps load test negative, yerguson test negative, Naranjito's test guarded  -sensation intact, pulses 2+  -TTP: AC joint    Imaging:  Sports Medicine US - Guidance for Needle Placement  Jhony Castellanos MD     7/9/2024 10:45 AM  Sports Medicine US - Guidance for Needle Placement    Date/Time: 7/9/2024 10:40 AM    Performed by: Jhony Castellanos MD  Authorized by: Jhony Castellanos MD  Preparation: Patient was prepped and   draped in the usual sterile fashion.  Local anesthesia used: no    Anesthesia:  Local anesthesia used: no    Sedation:  Patient sedated: no    Patient tolerance: patient tolerated the procedure well with no immediate   complications  Comments: Ultrasound guidance was used for needle localization. Images   were saved and stored for documentation. The appropriate structures were   visualized. Dynamic visualization of the needle was continuous throughout   the procedures and maintained good position.         Relevant imaging results  were reviewed and interpreted by me and per my read shows large full thickness RC tears and AC arthritic changes on MRI.  This was discussed with the patient and / or family today.     Assessment:  Carla Naranjo is a 53 y.o. female following up for right shoulder pain. Steroid injection worked well last time and is interested in repeating again today. Not interested in surgery despite full RC tears.   Plan: Steroid injection given today (see separate procedure note for details). We discussed the proper protocols after the injection such as no submerging pools, baths tubs, or hot tubs for 24 hr.  Showering is okay today.  We also discussed that blood sugars can be elevated after an injection and asked patient to properly checked her sugars over the next few days and contact their PCP if there are any concerns.  We discussed red flags such as fevers, chills, red, warm, tender joint at the area of injection to please seek medical care immediately.   Continue conservative management for pain.   Follow up as needed. All questions answered.     Complete tear of right rotator cuff, unspecified whether traumatic  -     Sports Medicine US - Guidance for Needle Placement  -     Large Joint Aspiration/Injection: R subacromial bursa         Ultrasound guidance was used for needle localization. Images were saved and stored for documentation. The appropriate structures were visualized. Dynamic visualization of the needle was continuous throughout the procedures and maintained good position.      Electronically signed:  Jhony Castellanos MD, MPH  11/26/2024  10:39 AM

## 2024-12-03 DIAGNOSIS — M75.121 COMPLETE TEAR OF RIGHT ROTATOR CUFF, UNSPECIFIED WHETHER TRAUMATIC: ICD-10-CM

## 2024-12-03 DIAGNOSIS — M19.019 OSTEOARTHRITIS OF ACROMIOCLAVICULAR JOINT: ICD-10-CM

## 2024-12-03 RX ORDER — MELOXICAM 15 MG/1
15 TABLET ORAL DAILY
Qty: 30 TABLET | Refills: 1 | Status: SHIPPED | OUTPATIENT
Start: 2024-12-03

## 2024-12-06 ENCOUNTER — OFFICE VISIT (OUTPATIENT)
Dept: INTERNAL MEDICINE | Facility: CLINIC | Age: 54
End: 2024-12-06
Payer: COMMERCIAL

## 2024-12-06 VITALS
OXYGEN SATURATION: 98 % | SYSTOLIC BLOOD PRESSURE: 186 MMHG | RESPIRATION RATE: 16 BRPM | TEMPERATURE: 98 F | HEIGHT: 68 IN | WEIGHT: 204.13 LBS | HEART RATE: 96 BPM | DIASTOLIC BLOOD PRESSURE: 96 MMHG | BODY MASS INDEX: 30.94 KG/M2

## 2024-12-06 DIAGNOSIS — Z12.4 CERVICAL CANCER SCREENING: ICD-10-CM

## 2024-12-06 DIAGNOSIS — F32.A ANXIETY AND DEPRESSION: ICD-10-CM

## 2024-12-06 DIAGNOSIS — Z76.89 ENCOUNTER TO ESTABLISH CARE: Primary | ICD-10-CM

## 2024-12-06 DIAGNOSIS — F41.9 ANXIETY AND DEPRESSION: ICD-10-CM

## 2024-12-06 DIAGNOSIS — F33.1 MODERATE EPISODE OF RECURRENT MAJOR DEPRESSIVE DISORDER: ICD-10-CM

## 2024-12-06 DIAGNOSIS — I10 ESSENTIAL HYPERTENSION: Chronic | ICD-10-CM

## 2024-12-06 DIAGNOSIS — R01.1 HEART MURMUR: ICD-10-CM

## 2024-12-06 PROCEDURE — 99999 PR PBB SHADOW E&M-EST. PATIENT-LVL V: CPT | Mod: PBBFAC,,, | Performed by: NURSE PRACTITIONER

## 2024-12-06 RX ORDER — DULOXETIN HYDROCHLORIDE 30 MG/1
30 CAPSULE, DELAYED RELEASE ORAL DAILY
Qty: 30 CAPSULE | Refills: 0 | Status: SHIPPED | OUTPATIENT
Start: 2024-12-06

## 2024-12-06 RX ORDER — MIRTAZAPINE 15 MG/1
15 TABLET, FILM COATED ORAL NIGHTLY
Qty: 30 TABLET | Refills: 0 | Status: SHIPPED | OUTPATIENT
Start: 2024-12-06

## 2024-12-06 RX ORDER — AMLODIPINE BESYLATE 10 MG/1
10 TABLET ORAL DAILY
Qty: 90 TABLET | Refills: 1 | Status: SHIPPED | OUTPATIENT
Start: 2024-12-06

## 2024-12-06 NOTE — PROGRESS NOTES
Subjective:       Patient ID: Carla Naranjo is a 53 y.o. female.    Chief Complaint: Depression      History of Present Illness    CHIEF COMPLAINT:  - Ms. Naranjo presents for management of depression and high blood pressure, as well as concerns about menopausal symptoms.    HPI:  Ms. Naranjo presents to visit to establish care and for treatment of depression. Reports a history of depression since age 18, following her grandmother's suicide. She has intermittently used medication throughout her life. Ms. Naranjo, a recovering drug addict sober for 3.5 years, describes recent exacerbation of depressive symptoms following significant life stressors. These include her 's job loss about 1.5 years ago, resulting in financial strain, and her own recent job loss at [employer] where she had worked for a year. Denies SI.    Was on cymbalta, abilify, remeron, and an injectable medication once she got out of sober living. [place] that she was doing ok so stopped medications over a year ago.     Ms. Naranjo reports symptoms consistent with menopause, including nocturnal hot flashes, profuse sweating, decreased libido, and reduced interest in physical affection towards her .    Regarding her blood pressure, the patient reports non-adherence to prescribed medications (amlodipine and lisinopril) for years. Her blood pressure was measured as 186/98 during the visit.    Ms. Naranjo also reports having 2 torn rotator cuffs, for which she receives injections every 3 months from an orthopedist. She is currently taking Flexeril and meloxicam for this condition.    Ms. Naranjo denies any allergies, thoughts of self-harm, chest pain, shortness of breath, lightheadedness, or dizziness. She denies any recent labs, flu vaccines, or pap smears in the past 3 years.      SOCIAL HISTORY:  - Drug use: Recovering addict, sober for 3.5 years  - Marital status:         Patient Active Problem List   Diagnosis    Acute cystitis     "Hypokalemia    Essential hypertension    Anxiety and depression    Elevated liver enzymes    Vaginal bleeding    Moderate episode of recurrent major depressive disorder         Past Surgical History:   Procedure Laterality Date     SECTION           Current Outpatient Medications:     cyclobenzaprine (FLEXERIL) 10 MG tablet, Take 1 tablet (10 mg total) by mouth every evening., Disp: 30 tablet, Rfl: 1    meloxicam (MOBIC) 15 MG tablet, Take 1 tablet (15 mg total) by mouth once daily., Disp: 30 tablet, Rfl: 1    amLODIPine (NORVASC) 10 MG tablet, Take 1 tablet (10 mg total) by mouth once daily., Disp: 90 tablet, Rfl: 1    DULoxetine (CYMBALTA) 30 MG capsule, Take 1 capsule (30 mg total) by mouth once daily., Disp: 30 capsule, Rfl: 0    mirtazapine (REMERON) 15 MG tablet, Take 1 tablet (15 mg total) by mouth every evening., Disp: 30 tablet, Rfl: 0    Review of Systems   Constitutional:  Negative for chills, fatigue and fever.   Respiratory:  Negative for cough and shortness of breath.    Gastrointestinal:  Negative for diarrhea, nausea and vomiting.   Genitourinary:  Negative for dysuria, frequency and urgency.   Neurological:  Negative for dizziness, light-headedness and headaches.   Psychiatric/Behavioral:  Positive for dysphoric mood. The patient is nervous/anxious.        Objective:   BP (!) 186/96 (BP Location: Left arm, Patient Position: Sitting)   Pulse 96   Temp 98.2 °F (36.8 °C) (Oral)   Resp 16   Ht 5' 8" (1.727 m)   Wt 92.6 kg (204 lb 2.3 oz)   LMP 2018 (Exact Date)   SpO2 98%   BMI 31.04 kg/m²      Physical Exam  Constitutional:       General: She is not in acute distress.     Appearance: Normal appearance. She is obese. She is not ill-appearing.   Cardiovascular:      Rate and Rhythm: Normal rate and regular rhythm.      Heart sounds: Murmur heard.      No friction rub. No gallop.   Pulmonary:      Effort: Pulmonary effort is normal. No respiratory distress.      Breath sounds: Normal " breath sounds. No wheezing.   Skin:     General: Skin is dry.      Coloration: Skin is not pale.      Findings: No erythema.   Neurological:      Mental Status: She is alert and oriented to person, place, and time.   Psychiatric:         Mood and Affect: Mood is anxious and depressed. Affect is tearful.         Speech: Speech normal.         Behavior: Behavior is cooperative.         Thought Content: Thought content does not include suicidal ideation. Thought content does not include suicidal plan.         Assessment & Plan     Problem List Items Addressed This Visit          Psychiatric    Anxiety and depression    Moderate episode of recurrent major depressive disorder    Relevant Medications    mirtazapine (REMERON) 15 MG tablet    DULoxetine (CYMBALTA) 30 MG capsule    Other Relevant Orders    Ambulatory referral/consult to Psychiatry       Cardiac/Vascular    Essential hypertension (Chronic)    Relevant Medications    amLODIPine (NORVASC) 10 MG tablet     Other Visit Diagnoses       Encounter to establish care    -  Primary    Heart murmur        Relevant Orders    Ambulatory referral/consult to Cardiology    Cervical cancer screening        Relevant Orders    Ambulatory referral/consult to Gynecology            Assessment & Plan    MEDICAL DECISION MAKING:  - Restarted antihypertensive therapy with amlodipine due to significantly elevated blood pressure  - Initiated antidepressant treatment with Cymbalta 30mg daily, with plan to titrate up if needed  - Started Remeron 15mg at night for depression and sleep issues  - Identified heart murmur on exam, recommending cardiology follow-up for monitoring of known birth defect  - Deferred comprehensive lab work and wellness exam until patient is feeling better and financial assistance is arranged    PATIENT EDUCATION:  - Educated on importance of blood pressure control and associated risks of stroke and heart attack with uncontrolled hypertension  - Discussed potential  "job opportunities and importance of maintaining sobriety  - Explained need for periodic monitoring of heart murmur by cardiologist    ACTION ITEMS/LIFESTYLE:  - Ms. Naranjo to monitor blood pressure at home using mother's blood pressure machine  - Ms. Naranjo to apply for financial assistance and explore Medicaid eligibility to address concerns about healthcare costs  - Recommend considering job opportunities at various establishments (e.g., [employer], [employer]) to address current unemployment    MEDICATIONS:  - Started amlodipine 10mg daily for hypertension, provided 90-day supply  - Started Cymbalta 30mg daily for depression  - Started Remeron 15mg at bedtime for depression and sleep  - Discontinued Prozac    REFERRALS:  - Referred to gynecology for menopause symptoms and pap smear  - Referred to cardiology for monitoring of heart murmur  - Referred to psychiatry for comprehensive mental health evaluation and management    FOLLOW UP:  - Follow up in 2 weeks for blood pressure check (no copay required)  - Follow up in the next month for annual wellness visit with labs  - Contact the office if no improvement with current antidepressant regimen in a few weeks to consider dose increase         Follow up in about 2 weeks (around 12/20/2024) for hypertension nurse visit.          Portions of this note may have been created with voice recognition software. Occasional "wrong-word" or "sound-a-like" substitutions may have occurred due to the inherent limitations of voice recognition software. Please, read the note carefully and recognize, using context, where substitutions have occurred.       This note was generated with the assistance of ambient listening technology. Verbal consent was obtained by the patient and accompanying visitor(s) for the recording of patient appointment to facilitate this note. I attest to having reviewed and edited the generated note for accuracy, though some syntax or spelling errors may persist. " Please contact the author of this note for any clarification.

## 2024-12-09 ENCOUNTER — PATIENT MESSAGE (OUTPATIENT)
Dept: PSYCHIATRY | Facility: CLINIC | Age: 54
End: 2024-12-09
Payer: COMMERCIAL

## 2024-12-11 DIAGNOSIS — I10 ESSENTIAL HYPERTENSION: ICD-10-CM

## 2024-12-20 ENCOUNTER — CLINICAL SUPPORT (OUTPATIENT)
Dept: INTERNAL MEDICINE | Facility: CLINIC | Age: 54
End: 2024-12-20
Payer: COMMERCIAL

## 2024-12-20 VITALS — DIASTOLIC BLOOD PRESSURE: 88 MMHG | SYSTOLIC BLOOD PRESSURE: 138 MMHG

## 2024-12-20 DIAGNOSIS — F33.1 MODERATE EPISODE OF RECURRENT MAJOR DEPRESSIVE DISORDER: Primary | ICD-10-CM

## 2024-12-20 PROCEDURE — 99999 PR PBB SHADOW E&M-EST. PATIENT-LVL I: CPT | Mod: PBBFAC,,,

## 2024-12-20 NOTE — PROGRESS NOTES
Patient presented to clinic for a blood pressure check.  Patient states she is still very depressed and will come in to [facility] on Monday

## 2024-12-23 ENCOUNTER — OFFICE VISIT (OUTPATIENT)
Dept: INTERNAL MEDICINE | Facility: CLINIC | Age: 54
End: 2024-12-23
Payer: COMMERCIAL

## 2024-12-23 VITALS
RESPIRATION RATE: 14 BRPM | HEIGHT: 68 IN | HEART RATE: 92 BPM | TEMPERATURE: 98 F | OXYGEN SATURATION: 98 % | SYSTOLIC BLOOD PRESSURE: 154 MMHG | DIASTOLIC BLOOD PRESSURE: 84 MMHG | BODY MASS INDEX: 31.17 KG/M2 | WEIGHT: 205.69 LBS

## 2024-12-23 DIAGNOSIS — F32.A ANXIETY AND DEPRESSION: ICD-10-CM

## 2024-12-23 DIAGNOSIS — F33.1 MODERATE EPISODE OF RECURRENT MAJOR DEPRESSIVE DISORDER: ICD-10-CM

## 2024-12-23 DIAGNOSIS — I10 ESSENTIAL HYPERTENSION: Primary | Chronic | ICD-10-CM

## 2024-12-23 DIAGNOSIS — F41.9 ANXIETY AND DEPRESSION: ICD-10-CM

## 2024-12-23 PROCEDURE — 3077F SYST BP >= 140 MM HG: CPT | Mod: CPTII,S$GLB,, | Performed by: NURSE PRACTITIONER

## 2024-12-23 PROCEDURE — 1159F MED LIST DOCD IN RCRD: CPT | Mod: CPTII,S$GLB,, | Performed by: NURSE PRACTITIONER

## 2024-12-23 PROCEDURE — 1160F RVW MEDS BY RX/DR IN RCRD: CPT | Mod: CPTII,S$GLB,, | Performed by: NURSE PRACTITIONER

## 2024-12-23 PROCEDURE — 99999 PR PBB SHADOW E&M-EST. PATIENT-LVL IV: CPT | Mod: PBBFAC,,, | Performed by: NURSE PRACTITIONER

## 2024-12-23 PROCEDURE — 99214 OFFICE O/P EST MOD 30 MIN: CPT | Mod: S$GLB,,, | Performed by: NURSE PRACTITIONER

## 2024-12-23 PROCEDURE — 3008F BODY MASS INDEX DOCD: CPT | Mod: CPTII,S$GLB,, | Performed by: NURSE PRACTITIONER

## 2024-12-23 PROCEDURE — 3079F DIAST BP 80-89 MM HG: CPT | Mod: CPTII,S$GLB,, | Performed by: NURSE PRACTITIONER

## 2024-12-23 RX ORDER — DULOXETIN HYDROCHLORIDE 30 MG/1
30 CAPSULE, DELAYED RELEASE ORAL 2 TIMES DAILY
Qty: 60 CAPSULE | Refills: 0 | Status: SHIPPED | OUTPATIENT
Start: 2024-12-23

## 2024-12-23 RX ORDER — ARIPIPRAZOLE 5 MG/1
5 TABLET ORAL DAILY
Qty: 30 TABLET | Refills: 0 | Status: SHIPPED | OUTPATIENT
Start: 2024-12-23

## 2024-12-23 NOTE — ASSESSMENT & PLAN NOTE
Increasing cymbalta to 30 BID. Will get dose of abilify from several years ago from pharmacy. Resources given to outside psychiatry.

## 2024-12-23 NOTE — ASSESSMENT & PLAN NOTE
Increasing cymbalta to 30 bid. Previously on Abilify. Starting on low dose.  Referral previously sent to psych. No appt made at this time due to dept not reaching out to patient yet.

## 2024-12-23 NOTE — PROGRESS NOTES
Subjective:       Patient ID: Carla Naranjo is a 53 y.o. female.    Chief Complaint: Panic Attack      History of Present Illness    CHIEF COMPLAINT:  - Ms. Naranjo presents with concerns about high blood pressure, panic attacks, and medication management for mental health issues.    HPI:  Ms. Naranjo presents to visit for follow up on anxiety/depression. Started back on remeron and cymbalta at last visit two weeks ago. Does not feel that it has helped at all. Denies any negative side effects since starting but no improvement of anxiety/depression. Has had two recent panic attacks, including one during a job interview at [employer] that caused her to leave prematurely. She has been taking Cymbalta for 2-3 weeks without improvement. Ms. Naranjo has a history of taking Cymbalta, Abilify, and Remeron, as well as receiving Vivitrol injections for drug treatment, which she discontinued along with all her other medications. Continues to be sober. She expresses difficulty obtaining and maintaining employment due to mental health issues impacting her job interview performance. Ms. Naranjo mentions a family history of suicide, with both her father and grandmother having committed suicide. She is currently journaling to manage symptoms and denies thoughts of self-harm. Referral sent for psych at last visit, but has not received phone call yet.            Patient Active Problem List   Diagnosis    Acute cystitis    Hypokalemia    Essential hypertension    Anxiety and depression    Elevated liver enzymes    Vaginal bleeding    Moderate episode of recurrent major depressive disorder         Past Surgical History:   Procedure Laterality Date     SECTION           Current Outpatient Medications:     amLODIPine (NORVASC) 10 MG tablet, Take 1 tablet (10 mg total) by mouth once daily., Disp: 90 tablet, Rfl: 1    cyclobenzaprine (FLEXERIL) 10 MG tablet, Take 1 tablet (10 mg total) by mouth every evening., Disp: 30 tablet,  "Rfl: 1    meloxicam (MOBIC) 15 MG tablet, Take 1 tablet (15 mg total) by mouth once daily., Disp: 30 tablet, Rfl: 1    mirtazapine (REMERON) 15 MG tablet, Take 1 tablet (15 mg total) by mouth every evening., Disp: 30 tablet, Rfl: 0    ARIPiprazole (ABILIFY) 5 MG Tab, Take 1 tablet (5 mg total) by mouth once daily., Disp: 30 tablet, Rfl: 0    DULoxetine (CYMBALTA) 30 MG capsule, Take 1 capsule (30 mg total) by mouth 2 (two) times daily., Disp: 60 capsule, Rfl: 0    Review of Systems   Constitutional:  Negative for chills, fatigue and fever.   Respiratory:  Negative for cough and shortness of breath.    Cardiovascular:  Negative for chest pain and palpitations.   Gastrointestinal:  Negative for diarrhea, nausea and vomiting.   Genitourinary:  Negative for dysuria, frequency and urgency.   Neurological:  Negative for dizziness, light-headedness and headaches.   Psychiatric/Behavioral:  Positive for dysphoric mood. The patient is nervous/anxious.        Objective:   BP (!) 154/84 (BP Location: Left arm, Patient Position: Sitting)   Pulse 92   Temp 97.9 °F (36.6 °C) (Oral)   Resp 14   Ht 5' 8" (1.727 m)   Wt 93.3 kg (205 lb 11 oz)   LMP 12/04/2018 (Exact Date)   SpO2 98%   BMI 31.27 kg/m²      Physical Exam  Constitutional:       General: She is not in acute distress.     Appearance: She is obese. She is not ill-appearing.   Cardiovascular:      Rate and Rhythm: Normal rate.   Pulmonary:      Effort: Pulmonary effort is normal. No respiratory distress.   Skin:     General: Skin is dry.      Coloration: Skin is not pale.      Findings: No erythema.   Neurological:      Mental Status: She is alert and oriented to person, place, and time.   Psychiatric:         Mood and Affect: Mood is anxious and depressed. Affect is tearful.         Speech: Speech normal.         Behavior: Behavior is cooperative.         Thought Content: Thought content does not include suicidal ideation. Thought content does not include suicidal " plan.         Assessment & Plan     Problem List Items Addressed This Visit          Psychiatric    Anxiety and depression    Current Assessment & Plan     Increasing cymbalta to 30 BID. Will get dose of abilify from several years ago from pharmacy. Resources given to outside psychiatry.          Moderate episode of recurrent major depressive disorder    Current Assessment & Plan     Increasing cymbalta to 30 bid. Previously on Abilify. Starting on low dose.  Referral previously sent to psych. No appt made at this time due to dept not reaching out to patient yet.          Relevant Medications    DULoxetine (CYMBALTA) 30 MG capsule    ARIPiprazole (ABILIFY) 5 MG Tab       Cardiac/Vascular    Essential hypertension - Primary (Chronic)    Current Assessment & Plan     Blood pressure elevated today. Will complete two week nurse visit since patient is anxious and tearful today.             Assessment & Plan    MEDICAL DECISION MAKING:  - Assessed current medication regimen and mental health status  - Considered increasing Cymbalta dosage due to ongoing symptoms and lack of improvement after 2 weeks  - Evaluated need for mood stabilizer, possible bipolar dx  - Recognized importance of psychiatric evaluation for proper medication management, especially given family history of suicide  - Considered restarting Abilify, pending confirmation of previous dosage from pharmacy    PATIENT EDUCATION:  - Explained that Cymbalta typically takes 4-6 weeks to reach therapeutic levels  - Discussed the importance of psychiatric evaluation for proper management of potential bipolar disorder  - Informed patient about the complexity of medication management for bipolar disorder and the need for specialized care    ACTION ITEMS/LIFESTYLE:  - Ms. Naranjo to continue journaling    MEDICATIONS:  - Increased Cymbalta to twice daily dosing  Adding low dose abilify    REFERRALS:  - Referred to psychiatry for evaluation and management of potential  "bipolar disorder    FOLLOW UP:  - Follow up in 2 weeks for nurse visit  - Contact the office if any concerns arise before the next appointment          Follow up in about 2 weeks (around 1/6/2025), or if symptoms worsen or fail to improve, for hypertension nurse visit .          Portions of this note may have been created with voice recognition software. Occasional "wrong-word" or "sound-a-like" substitutions may have occurred due to the inherent limitations of voice recognition software. Please, read the note carefully and recognize, using context, where substitutions have occurred.       This note was generated with the assistance of ambient listening technology. Verbal consent was obtained by the patient and accompanying visitor(s) for the recording of patient appointment to facilitate this note. I attest to having reviewed and edited the generated note for accuracy, though some syntax or spelling errors may persist. Please contact the author of this note for any clarification.       "

## 2024-12-23 NOTE — ASSESSMENT & PLAN NOTE
Blood pressure elevated today. Will complete two week nurse visit since patient is anxious and tearful today.

## 2024-12-28 DIAGNOSIS — F33.1 MODERATE EPISODE OF RECURRENT MAJOR DEPRESSIVE DISORDER: ICD-10-CM

## 2024-12-30 DIAGNOSIS — M75.121 COMPLETE TEAR OF RIGHT ROTATOR CUFF, UNSPECIFIED WHETHER TRAUMATIC: ICD-10-CM

## 2024-12-30 RX ORDER — MIRTAZAPINE 15 MG/1
15 TABLET, FILM COATED ORAL NIGHTLY
Qty: 90 TABLET | Refills: 1 | Status: SHIPPED | OUTPATIENT
Start: 2024-12-30

## 2025-01-02 RX ORDER — CYCLOBENZAPRINE HCL 10 MG
10 TABLET ORAL NIGHTLY
Qty: 30 TABLET | Refills: 1 | Status: SHIPPED | OUTPATIENT
Start: 2025-01-02 | End: 2025-03-03

## 2025-01-18 DIAGNOSIS — F33.1 MODERATE EPISODE OF RECURRENT MAJOR DEPRESSIVE DISORDER: ICD-10-CM

## 2025-01-20 DIAGNOSIS — F33.1 MODERATE EPISODE OF RECURRENT MAJOR DEPRESSIVE DISORDER: ICD-10-CM

## 2025-01-20 RX ORDER — ARIPIPRAZOLE 5 MG/1
5 TABLET ORAL
Qty: 90 TABLET | Refills: 1 | OUTPATIENT
Start: 2025-01-20

## 2025-01-20 RX ORDER — DULOXETIN HYDROCHLORIDE 30 MG/1
30 CAPSULE, DELAYED RELEASE ORAL 2 TIMES DAILY
Qty: 180 CAPSULE | Refills: 1 | OUTPATIENT
Start: 2025-01-20

## 2025-01-21 RX ORDER — DULOXETIN HYDROCHLORIDE 30 MG/1
30 CAPSULE, DELAYED RELEASE ORAL 2 TIMES DAILY
Qty: 60 CAPSULE | Refills: 0 | Status: SHIPPED | OUTPATIENT
Start: 2025-01-21

## 2025-01-21 RX ORDER — ARIPIPRAZOLE 5 MG/1
5 TABLET ORAL DAILY
Qty: 30 TABLET | Refills: 0 | Status: SHIPPED | OUTPATIENT
Start: 2025-01-21 | End: 2025-01-23 | Stop reason: SDUPTHER

## 2025-01-23 DIAGNOSIS — F33.1 MODERATE EPISODE OF RECURRENT MAJOR DEPRESSIVE DISORDER: ICD-10-CM

## 2025-01-23 RX ORDER — ARIPIPRAZOLE 5 MG/1
5 TABLET ORAL DAILY
Qty: 30 TABLET | Refills: 5 | Status: SHIPPED | OUTPATIENT
Start: 2025-01-23

## 2025-01-29 DIAGNOSIS — M75.121 COMPLETE TEAR OF RIGHT ROTATOR CUFF, UNSPECIFIED WHETHER TRAUMATIC: ICD-10-CM

## 2025-01-29 DIAGNOSIS — M19.019 OSTEOARTHRITIS OF ACROMIOCLAVICULAR JOINT: ICD-10-CM

## 2025-01-29 RX ORDER — MELOXICAM 15 MG/1
15 TABLET ORAL
Qty: 30 TABLET | Refills: 1 | Status: SHIPPED | OUTPATIENT
Start: 2025-01-29

## 2025-01-30 ENCOUNTER — HOSPITAL ENCOUNTER (OUTPATIENT)
Dept: RADIOLOGY | Facility: HOSPITAL | Age: 55
Discharge: HOME | End: 2025-01-30
Attending: NURSE PRACTITIONER
Payer: COMMERCIAL

## 2025-01-30 ENCOUNTER — OFFICE VISIT (OUTPATIENT)
Dept: INTERNAL MEDICINE | Facility: CLINIC | Age: 55
End: 2025-01-30
Payer: COMMERCIAL

## 2025-01-30 VITALS
WEIGHT: 227.31 LBS | HEART RATE: 96 BPM | BODY MASS INDEX: 34.45 KG/M2 | HEIGHT: 68 IN | DIASTOLIC BLOOD PRESSURE: 88 MMHG | TEMPERATURE: 98 F | SYSTOLIC BLOOD PRESSURE: 164 MMHG | OXYGEN SATURATION: 98 %

## 2025-01-30 DIAGNOSIS — M25.572 ACUTE LEFT ANKLE PAIN: ICD-10-CM

## 2025-01-30 DIAGNOSIS — F32.A ANXIETY AND DEPRESSION: ICD-10-CM

## 2025-01-30 DIAGNOSIS — F41.9 ANXIETY AND DEPRESSION: ICD-10-CM

## 2025-01-30 DIAGNOSIS — F33.1 MODERATE EPISODE OF RECURRENT MAJOR DEPRESSIVE DISORDER: ICD-10-CM

## 2025-01-30 DIAGNOSIS — M79.662 PAIN AND SWELLING OF LEFT LOWER LEG: ICD-10-CM

## 2025-01-30 DIAGNOSIS — L30.9 DERMATITIS: ICD-10-CM

## 2025-01-30 DIAGNOSIS — M79.89 PAIN AND SWELLING OF LEFT LOWER LEG: ICD-10-CM

## 2025-01-30 DIAGNOSIS — I10 ESSENTIAL HYPERTENSION: Chronic | ICD-10-CM

## 2025-01-30 DIAGNOSIS — M25.572 ACUTE LEFT ANKLE PAIN: Primary | ICD-10-CM

## 2025-01-30 PROCEDURE — 1160F RVW MEDS BY RX/DR IN RCRD: CPT | Mod: CPTII,S$GLB,, | Performed by: NURSE PRACTITIONER

## 2025-01-30 PROCEDURE — 3079F DIAST BP 80-89 MM HG: CPT | Mod: CPTII,S$GLB,, | Performed by: NURSE PRACTITIONER

## 2025-01-30 PROCEDURE — 99214 OFFICE O/P EST MOD 30 MIN: CPT | Mod: S$GLB,,, | Performed by: NURSE PRACTITIONER

## 2025-01-30 PROCEDURE — 99999 PR PBB SHADOW E&M-EST. PATIENT-LVL IV: CPT | Mod: PBBFAC,,, | Performed by: NURSE PRACTITIONER

## 2025-01-30 PROCEDURE — 1159F MED LIST DOCD IN RCRD: CPT | Mod: CPTII,S$GLB,, | Performed by: NURSE PRACTITIONER

## 2025-01-30 PROCEDURE — 73610 X-RAY EXAM OF ANKLE: CPT | Mod: 26,LT,, | Performed by: RADIOLOGY

## 2025-01-30 PROCEDURE — 73610 X-RAY EXAM OF ANKLE: CPT | Mod: TC,PO,LT

## 2025-01-30 PROCEDURE — 3008F BODY MASS INDEX DOCD: CPT | Mod: CPTII,S$GLB,, | Performed by: NURSE PRACTITIONER

## 2025-01-30 PROCEDURE — 3077F SYST BP >= 140 MM HG: CPT | Mod: CPTII,S$GLB,, | Performed by: NURSE PRACTITIONER

## 2025-01-30 RX ORDER — PREDNISONE 20 MG/1
TABLET ORAL
Qty: 9 TABLET | Refills: 0 | Status: SHIPPED | OUTPATIENT
Start: 2025-01-30 | End: 2025-02-06

## 2025-01-30 NOTE — PROGRESS NOTES
Subjective:       Patient ID: Carla Naranjo is a 54 y.o. female.    Chief Complaint: Rash (Red, itching and swollen)      History of Present Illness    CHIEF COMPLAINT:  - Ms. Naranjo presents with left ankle pain and a rash on both legs that has been worsening over the past few days.    HPI:  Ms. Naranjo reports left ankle pain that started 2-3 days ago and has been progressively worsening. The pain is exacerbated by pressure and weight-bearing, described as severe, and interferes with sleep due to pain and swelling. The swelling subsides at night when supine but returns in the morning.    Concurrently, the patient developed a rash on both legs approximately 3 days ago. Ms. Naranjo attributes the rash to wearing new socks given by a friend, friction from jeans, and increased sweating at work due to lack of air conditioning. The rash is associated with constant pruritus.    Ms. Naranjo notes that the left leg appears more edematous than the right. She has been working 10-hour shifts at [employer], which may be contributing to the symptoms. Ms. Naranjo mentions feeling generally warm all over, which might be related to the pruritus.    Ms. Naranjo has a pre-existing heart murmur and has an upcoming cardiologist appointment scheduled for 2025. She is on Abilify for depression and reports improvement in that regard.    Ms. Naranjo denies any recent falls or injuries to the ankle.           Patient Active Problem List   Diagnosis    Acute cystitis    Hypokalemia    Essential hypertension    Anxiety and depression    Elevated liver enzymes    Vaginal bleeding    Moderate episode of recurrent major depressive disorder         Past Surgical History:   Procedure Laterality Date     SECTION           Current Outpatient Medications:     amLODIPine (NORVASC) 10 MG tablet, Take 1 tablet (10 mg total) by mouth once daily., Disp: 90 tablet, Rfl: 1    ARIPiprazole (ABILIFY) 5 MG Tab, Take 1 tablet (5 mg  "total) by mouth once daily., Disp: 30 tablet, Rfl: 5    cyclobenzaprine (FLEXERIL) 10 MG tablet, Take 1 tablet (10 mg total) by mouth every evening., Disp: 30 tablet, Rfl: 1    DULoxetine (CYMBALTA) 30 MG capsule, Take 1 capsule (30 mg total) by mouth 2 (two) times daily., Disp: 60 capsule, Rfl: 0    meloxicam (MOBIC) 15 MG tablet, TAKE 1 TABLET BY MOUTH EVERY DAY, Disp: 30 tablet, Rfl: 1    mirtazapine (REMERON) 15 MG tablet, TAKE 1 TABLET BY MOUTH EVERY EVENING., Disp: 90 tablet, Rfl: 1    predniSONE (DELTASONE) 20 MG tablet, Take 2 tablets (40 mg total) by mouth once daily for 3 days, THEN 1 tablet (20 mg total) once daily for 2 days, THEN 0.5 tablets (10 mg total) once daily for 2 days., Disp: 9 tablet, Rfl: 0    Review of Systems   Constitutional:  Negative for activity change, appetite change, chills, fatigue and fever.   Respiratory:  Negative for apnea, cough, chest tightness, shortness of breath and wheezing.    Cardiovascular:  Negative for chest pain, palpitations and leg swelling.   Gastrointestinal:  Negative for abdominal pain, constipation, diarrhea, nausea and vomiting.   Musculoskeletal:  Positive for arthralgias, joint swelling and myalgias. Negative for back pain and gait problem.   Skin:  Positive for rash (BLE ankles from socks).   Neurological:  Negative for dizziness, syncope, weakness, light-headedness, numbness and headaches.       Objective:   BP (!) 164/88 (BP Location: Left arm, Patient Position: Sitting)   Pulse 96   Temp 98.3 °F (36.8 °C) (Oral)   Ht 5' 8" (1.727 m)   Wt 103.1 kg (227 lb 4.7 oz)   LMP 12/04/2018 (Exact Date)   SpO2 98%   BMI 34.56 kg/m²      Physical Exam  Vitals reviewed.   Constitutional:       General: She is not in acute distress.     Appearance: Normal appearance. She is well-developed. She is not ill-appearing or diaphoretic.   HENT:      Head: Normocephalic.   Cardiovascular:      Rate and Rhythm: Normal rate and regular rhythm.      Pulses: Normal pulses. "      Heart sounds: Murmur heard.      No friction rub. No gallop.   Pulmonary:      Effort: Pulmonary effort is normal. No respiratory distress.      Breath sounds: Normal breath sounds. No rales.   Musculoskeletal:      Cervical back: Normal range of motion and neck supple.      Left ankle: Swelling present. Tenderness present over the lateral malleolus and medial malleolus.   Skin:     General: Skin is warm and dry.      Coloration: Skin is not pale.      Findings: No erythema.   Neurological:      Mental Status: She is alert and oriented to person, place, and time.   Psychiatric:         Mood and Affect: Mood normal.         Behavior: Behavior normal.                   Assessment & Plan     Problem List Items Addressed This Visit          Psychiatric    Anxiety and depression    Current Assessment & Plan     Stable on current medications.          Moderate episode of recurrent major depressive disorder    Current Assessment & Plan     Stable since restarting abilify and increasing cymbalta. Continue current doses.             Cardiac/Vascular    Essential hypertension (Chronic)    Relevant Orders    CBC Auto Differential     Other Visit Diagnoses       Acute left ankle pain    -  Primary    Relevant Orders    X-Ray Ankle Complete Left    Pain and swelling of left lower leg        Relevant Orders    D-DIMER, QUANTITATIVE    Dermatitis        Relevant Medications    predniSONE (DELTASONE) 20 MG tablet            Assessment & Plan    MEDICAL DECISION MAKING:  - Suspected allergic reaction causing rash and swelling on both legs, more pronounced on left leg  - Considered possibility of blood clot due to asymmetric swelling  - Ordered D-dimer to assess risk of blood clot  - Planned x-ray of left ankle to evaluate swelling and pain  - Considered ultrasound if D-dimer suggests increased clot risk    PATIENT EDUCATION:  - Explained potential for steroid to interfere with sleep  - Discussed possibility of allergic reaction  "from new socks or jeans    ACTION ITEMS/LIFESTYLE:  - Ms. Naranjo to take Benadryl with prescribed steroid to help with itching and sleep    MEDICATIONS:  - Started steroid to reduce swelling and itching  - Recommend OTC Benadryl to be taken with steroid for itching and sleep    ORDERS:  - D-dimer blood test ordered  - X-ray of left ankle ordered             Follow up if symptoms worsen or fail to improve.          Portions of this note may have been created with voice recognition software. Occasional "wrong-word" or "sound-a-like" substitutions may have occurred due to the inherent limitations of voice recognition software. Please, read the note carefully and recognize, using context, where substitutions have occurred.       This note was generated with the assistance of ambient listening technology. Verbal consent was obtained by the patient and accompanying visitor(s) for the recording of patient appointment to facilitate this note. I attest to having reviewed and edited the generated note for accuracy, though some syntax or spelling errors may persist. Please contact the author of this note for any clarification.       "

## 2025-01-31 DIAGNOSIS — D64.9 ANEMIA, UNSPECIFIED TYPE: ICD-10-CM

## 2025-01-31 DIAGNOSIS — R79.89 ELEVATED D-DIMER: ICD-10-CM

## 2025-01-31 DIAGNOSIS — M79.662 PAIN AND SWELLING OF LEFT LOWER LEG: Primary | ICD-10-CM

## 2025-01-31 DIAGNOSIS — M79.89 PAIN AND SWELLING OF LEFT LOWER LEG: Primary | ICD-10-CM

## 2025-02-03 ENCOUNTER — TELEPHONE (OUTPATIENT)
Dept: FAMILY MEDICINE | Facility: CLINIC | Age: 55
End: 2025-02-03
Payer: COMMERCIAL

## 2025-02-03 NOTE — TELEPHONE ENCOUNTER
Spoke with patient who stated she received her result and has been appointment has been schedule for tomorrow

## 2025-02-03 NOTE — TELEPHONE ENCOUNTER
----- Message from Jimena sent at 2/3/2025  3:11 PM CST -----  Contact: Carla  Type:  Patient Returning Call    Who Called: Carla  Who Left Message for Patient: Unknown  Does the patient know what this is regarding?:results  Would the patient rather a call back or a response via [facility]? Call back  Best Call Back Number:[phone]  Additional Information:

## 2025-02-04 ENCOUNTER — HOSPITAL ENCOUNTER (OUTPATIENT)
Dept: RADIOLOGY | Facility: HOSPITAL | Age: 55
Discharge: HOME | End: 2025-02-04
Attending: NURSE PRACTITIONER
Payer: COMMERCIAL

## 2025-02-04 DIAGNOSIS — M79.662 PAIN AND SWELLING OF LEFT LOWER LEG: ICD-10-CM

## 2025-02-04 DIAGNOSIS — M79.89 PAIN AND SWELLING OF LEFT LOWER LEG: ICD-10-CM

## 2025-02-04 DIAGNOSIS — R79.89 ELEVATED D-DIMER: ICD-10-CM

## 2025-02-04 PROCEDURE — 93971 EXTREMITY STUDY: CPT | Mod: 26,LT,, | Performed by: RADIOLOGY

## 2025-02-04 PROCEDURE — 93971 EXTREMITY STUDY: CPT | Mod: TC,PO,LT

## 2025-02-05 DIAGNOSIS — D50.9 IRON DEFICIENCY ANEMIA, UNSPECIFIED IRON DEFICIENCY ANEMIA TYPE: Primary | ICD-10-CM

## 2025-02-05 RX ORDER — FERROUS SULFATE 325(65) MG
325 TABLET ORAL 2 TIMES DAILY
Qty: 180 TABLET | Refills: 0 | Status: SHIPPED | OUTPATIENT
Start: 2025-02-05

## 2025-02-10 ENCOUNTER — PATIENT MESSAGE (OUTPATIENT)
Dept: ORTHOPEDICS | Facility: CLINIC | Age: 55
End: 2025-02-10
Payer: COMMERCIAL

## 2025-02-26 ENCOUNTER — OFFICE VISIT (OUTPATIENT)
Dept: SPORTS MEDICINE | Facility: CLINIC | Age: 55
End: 2025-02-26
Payer: COMMERCIAL

## 2025-02-26 ENCOUNTER — OFFICE VISIT (OUTPATIENT)
Dept: INTERNAL MEDICINE | Facility: CLINIC | Age: 55
End: 2025-02-26
Payer: COMMERCIAL

## 2025-02-26 VITALS
TEMPERATURE: 99 F | OXYGEN SATURATION: 98 % | DIASTOLIC BLOOD PRESSURE: 84 MMHG | HEIGHT: 68 IN | BODY MASS INDEX: 33.68 KG/M2 | HEART RATE: 75 BPM | SYSTOLIC BLOOD PRESSURE: 150 MMHG | WEIGHT: 222.25 LBS

## 2025-02-26 DIAGNOSIS — Z12.11 COLON CANCER SCREENING: ICD-10-CM

## 2025-02-26 DIAGNOSIS — M75.121 COMPLETE TEAR OF RIGHT ROTATOR CUFF, UNSPECIFIED WHETHER TRAUMATIC: Primary | ICD-10-CM

## 2025-02-26 DIAGNOSIS — M19.072 PRIMARY OSTEOARTHRITIS OF LEFT ANKLE: ICD-10-CM

## 2025-02-26 DIAGNOSIS — Z12.4 CERVICAL CANCER SCREENING: ICD-10-CM

## 2025-02-26 DIAGNOSIS — M19.019 OSTEOARTHRITIS OF ACROMIOCLAVICULAR JOINT: ICD-10-CM

## 2025-02-26 DIAGNOSIS — F41.9 ANXIETY AND DEPRESSION: ICD-10-CM

## 2025-02-26 DIAGNOSIS — Z12.31 BREAST CANCER SCREENING BY MAMMOGRAM: ICD-10-CM

## 2025-02-26 DIAGNOSIS — I10 ESSENTIAL HYPERTENSION: Primary | Chronic | ICD-10-CM

## 2025-02-26 DIAGNOSIS — M19.072 OSTEOARTHRITIS OF LEFT ANKLE, UNSPECIFIED OSTEOARTHRITIS TYPE: Primary | ICD-10-CM

## 2025-02-26 DIAGNOSIS — F41.0 PANIC ATTACK: ICD-10-CM

## 2025-02-26 DIAGNOSIS — F33.1 MODERATE EPISODE OF RECURRENT MAJOR DEPRESSIVE DISORDER: ICD-10-CM

## 2025-02-26 DIAGNOSIS — F32.A ANXIETY AND DEPRESSION: ICD-10-CM

## 2025-02-26 PROCEDURE — 4010F ACE/ARB THERAPY RXD/TAKEN: CPT | Mod: CPTII,S$GLB,, | Performed by: NURSE PRACTITIONER

## 2025-02-26 PROCEDURE — 1160F RVW MEDS BY RX/DR IN RCRD: CPT | Mod: CPTII,S$GLB,, | Performed by: STUDENT IN AN ORGANIZED HEALTH CARE EDUCATION/TRAINING PROGRAM

## 2025-02-26 PROCEDURE — G2211 COMPLEX E/M VISIT ADD ON: HCPCS | Mod: S$GLB,,, | Performed by: NURSE PRACTITIONER

## 2025-02-26 PROCEDURE — 3008F BODY MASS INDEX DOCD: CPT | Mod: CPTII,S$GLB,, | Performed by: NURSE PRACTITIONER

## 2025-02-26 PROCEDURE — 4010F ACE/ARB THERAPY RXD/TAKEN: CPT | Mod: CPTII,S$GLB,, | Performed by: STUDENT IN AN ORGANIZED HEALTH CARE EDUCATION/TRAINING PROGRAM

## 2025-02-26 PROCEDURE — G2211 COMPLEX E/M VISIT ADD ON: HCPCS | Mod: S$GLB,,, | Performed by: STUDENT IN AN ORGANIZED HEALTH CARE EDUCATION/TRAINING PROGRAM

## 2025-02-26 PROCEDURE — 3077F SYST BP >= 140 MM HG: CPT | Mod: CPTII,S$GLB,, | Performed by: NURSE PRACTITIONER

## 2025-02-26 PROCEDURE — 3079F DIAST BP 80-89 MM HG: CPT | Mod: CPTII,S$GLB,, | Performed by: NURSE PRACTITIONER

## 2025-02-26 PROCEDURE — 99214 OFFICE O/P EST MOD 30 MIN: CPT | Mod: S$GLB,,, | Performed by: NURSE PRACTITIONER

## 2025-02-26 PROCEDURE — 99214 OFFICE O/P EST MOD 30 MIN: CPT | Mod: S$GLB,,, | Performed by: STUDENT IN AN ORGANIZED HEALTH CARE EDUCATION/TRAINING PROGRAM

## 2025-02-26 PROCEDURE — 1159F MED LIST DOCD IN RCRD: CPT | Mod: CPTII,S$GLB,, | Performed by: STUDENT IN AN ORGANIZED HEALTH CARE EDUCATION/TRAINING PROGRAM

## 2025-02-26 PROCEDURE — 99999 PR PBB SHADOW E&M-EST. PATIENT-LVL III: CPT | Mod: PBBFAC,,, | Performed by: STUDENT IN AN ORGANIZED HEALTH CARE EDUCATION/TRAINING PROGRAM

## 2025-02-26 PROCEDURE — 99999 PR PBB SHADOW E&M-EST. PATIENT-LVL V: CPT | Mod: PBBFAC,,, | Performed by: NURSE PRACTITIONER

## 2025-02-26 RX ORDER — DULOXETIN HYDROCHLORIDE 60 MG/1
60 CAPSULE, DELAYED RELEASE ORAL DAILY
Qty: 30 CAPSULE | Refills: 11 | Status: SHIPPED | OUTPATIENT
Start: 2025-02-26 | End: 2026-02-26

## 2025-02-26 RX ORDER — HYDROXYZINE HYDROCHLORIDE 25 MG/1
25 TABLET, FILM COATED ORAL 3 TIMES DAILY PRN
Qty: 30 TABLET | Refills: 0 | Status: SHIPPED | OUTPATIENT
Start: 2025-02-26

## 2025-02-26 RX ORDER — PREDNISONE 20 MG/1
60 TABLET ORAL DAILY
Qty: 15 TABLET | Refills: 0 | Status: SHIPPED | OUTPATIENT
Start: 2025-02-26 | End: 2025-03-03

## 2025-02-26 RX ORDER — DULOXETIN HYDROCHLORIDE 30 MG/1
30 CAPSULE, DELAYED RELEASE ORAL NIGHTLY
Qty: 30 CAPSULE | Refills: 11 | Status: SHIPPED | OUTPATIENT
Start: 2025-02-26

## 2025-02-26 RX ORDER — LISINOPRIL 20 MG/1
20 TABLET ORAL DAILY
Qty: 30 TABLET | Refills: 0 | Status: SHIPPED | OUTPATIENT
Start: 2025-02-26

## 2025-02-26 NOTE — PATIENT INSTRUCTIONS
Assessment:  Carla Naranjo is a 54 y.o. female No chief complaint on file.      No diagnosis found.     Plan:  Prescription for Prednisone Burst to be taken as directed   Apply topical diclofenac (Voltaren) up to 4 times a day to the affected area.  It can be bought over the counter at any local pharmacy.    Patient may ice every 2 hours for 15 minutes as needed to control pain and swelling.   Referral to Dr. Monica Ohara for ankle OA  Follow up as needed                    Follow-up: as needed.      Thank you for choosing [facility] and Dr. Jhony Castellanos for your orthopedic & sports medicine care. It is our goal to provide you with exceptional care that will help keep you healthy, active, and get you back in the game.    Please do not hesitate to reach out to us via email, phone, or MyChart with any questions, concerns, or feedback.    If you felt that you received exemplary care today, please consider leaving us feedback on [employer] at:  [email or web address]    If you are experiencing pain/discomfort ,or have questions after 5pm and would like to be connected to the [facility] on-call team, please call this number and specify which Sports Medicine provider is treating you: [phone]

## 2025-02-26 NOTE — PROGRESS NOTES
Patient ID: Carla Naranjo  YOB: 1970  MRN: [id number]    Chief Complaint: Pain of the Right Shoulder and Pain of the Left Ankle    History of Present Illness: Carla Naranjo is a right-hand dominant 54 y.o. female who presents today with right shoulder pain followup.  Last seen in clinic on 11/26/2024 where she received a subacromial cortisone injection to the right shoulder.  Rates her pain today at a 4/10.  Pain is greatest at night, when trying to get comfortable, and while at work.  Reports that small actions of making change and lifting objects increasing pain at work.  Believes that injection was helpful and interested in repeating.  Currently using heat, Voltaren gel, Meloxicam and Flexeril for symptoms.  She is also here today complaining of left ankle pain. Ms. Naranjo reports left ankle pain that started 2-3 days ago and has been progressively worsening. The pain is exacerbated by pressure and weight-bearing, described as severe, and interferes with sleep due to pain and swelling. The swelling subsides at night when supine but returns in the morning.         11/26/2024 Interval History of Present Illness: Carla Naranjo is a right-hand dominant 54 y.o. female who presents today with right shoulder pain.  Last seen in clinic on 7/9/2024 where she received a subacromial cortisone injection to the right shoulder.  Rates her pain today at a 4/10.  Pain is greatest at night, when trying to get comfortable, and while at work.  Reports that small actions of making change and lifting objects increasing pain at work.  Believes that injection was helpful and interested in repeating.  Currently using heat, Voltaren gel, Meloxicam and Flexeril for symptoms.  Unable to ice due to an increase in pain.    7/9/2024 Interval History of Present Illness: Carla Naranjo is a right-hand dominant 53 y.o. female who presents today for followup right shoulder, she received CSI on  , mild relief she is still complaining of pain after her work day. He is working at [employer]. Pain is moderate, constant, and preventing patient from lifting her arm above her head. Denies any trauma, fever, chills. Taking tylenol/ibuprofen. Her pain is some better after taking Methocarbamol at least 3 times a day. Referral sent to PT, she has not tried Physical therapy. She is here to try another CSI    2024 Interval History of Present Illness: Carla Naranjo is a right-hand dominant 53 y.o. female who presents today with right shoulder pain. Her LOV with CSI 24, she report relief lasted until 2 weeks ago. She c/o 8/10 achy, throbbing and constant pain today. She report the pain does interfere with her sleep at night. She report lifting and certain and movements makes pain worse. She reports the right shoulder is swollen and tightening up. She uses OTC cream and epsom salt soak for pain. She was prescribed muscle relaxer's for pain. She previously had a MRI done on 24. She was referred to Dr. Love for surgical consult but she is interested in options before surgery.     The patient is active in none.  Occupation: [employer]       Past Medical History:   Past Medical History:   Diagnosis Date    DANIELA (acute kidney injury) 2018    Anxiety     Depression     Duodenitis without bleeding 2018    History of opioid abuse     Hypertension     Hyponatremia and hypokalemia 2018    Personality disorder      Past Surgical History:   Procedure Laterality Date     SECTION       Family History   Problem Relation Name Age of Onset    Muscular dystrophy Paternal Grandmother      Suicide Paternal Grandmother      Alcohol abuse Father      Drug abuse Father      No Known Problems Mother      Pneumonia Maternal Grandmother      Alzheimer's disease Maternal Grandfather      Heart disease Paternal Grandfather       Social History     Socioeconomic History    Marital status:     Tobacco Use    Smoking status: Never    Smokeless tobacco: Current    Tobacco comments:     vape   Substance and Sexual Activity    Alcohol use: Yes     Comment: once every 3 months     Drug use: No    Sexual activity: Yes     Partners: Male     Medication List with Changes/Refills   New Medications    PREDNISONE (DELTASONE) 20 MG TABLET    Take 3 tablets (60 mg total) by mouth once daily. for 5 days   Current Medications    AMLODIPINE (NORVASC) 10 MG TABLET    Take 1 tablet (10 mg total) by mouth once daily.    ARIPIPRAZOLE (ABILIFY) 5 MG TAB    Take 1 tablet (5 mg total) by mouth once daily.    CYCLOBENZAPRINE (FLEXERIL) 10 MG TABLET    Take 1 tablet (10 mg total) by mouth every evening.    DULOXETINE (CYMBALTA) 30 MG CAPSULE    Take 1 capsule (30 mg total) by mouth every evening.    DULOXETINE (CYMBALTA) 60 MG CAPSULE    Take 1 capsule (60 mg total) by mouth once daily.    FERROUS SULFATE (FEOSOL) 325 MG (65 MG IRON) TAB TABLET    Take 1 tablet (325 mg total) by mouth 2 (two) times daily.    HYDROXYZINE HCL (ATARAX) 25 MG TABLET    Take 1 tablet (25 mg total) by mouth 3 (three) times daily as needed (panic attack).    LISINOPRIL (PRINIVIL,ZESTRIL) 20 MG TABLET    Take 1 tablet (20 mg total) by mouth once daily.    MELOXICAM (MOBIC) 15 MG TABLET    TAKE 1 TABLET BY MOUTH EVERY DAY    MIRTAZAPINE (REMERON) 15 MG TABLET    TAKE 1 TABLET BY MOUTH EVERY EVENING.     Review of patient's allergies indicates:  No Known Allergies    Physical Exam:   There is no height or weight on file to calculate BMI.    GENERAL: Well appearing, in no acute distress.  HEAD: Normocephalic and atraumatic.  ENT: External ears and nose grossly normal.  EYES: EOMI bilaterally  PULMONARY: Respirations are grossly even and non-labored.  NEURO: Awake, alert, and oriented x 3.  SKIN: No obvious rashes appreciated.  PSYCH: Mood & affect are appropriate.    Detailed MSK exam:     Right shoulder exam:   -ROM: abduction 80, forward  flexion 90, external rotation 50, internal rotation 50  -empty can test positive, resisted ER positive, belly press pain but no weakness  -zhang test guarded, neers test guarded, whipple test guarded  -biceps load test negative, yerguson test negative, McCone's test guarded  -sensation intact, pulses 2+  -TTP: lateral cuff insertion    Imaging:  US Lower Extremity Veins Left  Narrative: EXAM: US LOWER EXTREMITY VEINS LEFT    CLINICAL HISTORY: Pain in left leg ,left lower extremity swelling.    TECHNIQUE:  Real-time, color, and duplex evaluation of the deep venous vessels in left lower extremity were performed.    FINDINGS: No comparison studies are available.   No evidence of deep venous thrombosis in the left lower extremity. The deep venous vessels demonstrate normal spontaneous, augmented, and respirophasicity flow. Deep venous vessels compress in a normal fashion throughout.  Impression:   No evidence of a left lower extremity deep venous thrombosis.    Finalized on: 2/4/2025 3:48 PM By:  Les Pineda MD  [facility]# [id number]      2025-02-04 15:50:01.184     [facility]        Relevant imaging results were reviewed and interpreted by me and per my read shows large full thickness RC tears and AC arthritic changes on MRI.  This was discussed with the patient and / or family today.     Assessment:  Carla Naranjo is a 54 y.o. female following up for right shoulder pain. Steroid injection worked well last time and is interested in repeating again today. Not interested in surgery despite full RC tears.   Plan: referral to Dr Ohara for left ankle OA and swelling. Will hold off on shoulder injection today to try prednisone burst instead because she would like to try something that would potentially help her shoulder and ankle while waiting to be seen by Dr Ohara. Consider repeat steroid injection for the shoulder in a few weeks if still having issues.    Continue conservative management for pain.   Follow up as  needed. All questions answered.     Complete tear of right rotator cuff, unspecified whether traumatic  -     predniSONE (DELTASONE) 20 MG tablet; Take 3 tablets (60 mg total) by mouth once daily. for 5 days  Dispense: 15 tablet; Refill: 0    Osteoarthritis of acromioclavicular joint  -     predniSONE (DELTASONE) 20 MG tablet; Take 3 tablets (60 mg total) by mouth once daily. for 5 days  Dispense: 15 tablet; Refill: 0    Primary osteoarthritis of left ankle  -     predniSONE (DELTASONE) 20 MG tablet; Take 3 tablets (60 mg total) by mouth once daily. for 5 days  Dispense: 15 tablet; Refill: 0         Today's visit is associated with current or anticipated ongoing medical care related to this patient's diagnosis of osteoarthritis.  Currently there is no cure of osteoarthritis and the patient will require regular follow up to manage symptoms and progression.  The patient is to return to the office within a minimum of 3-6 months to review symptoms and function at that time.   CPT code       Electronically signed:  Jhony Castellanos MD, MPH  02/26/2025  10:39 AM

## 2025-02-26 NOTE — PROGRESS NOTES
Subjective:       Patient ID: Carla Naranjo is a 54 y.o. female.    Chief Complaint: Depression and Anxiety      History of Present Illness    CHIEF COMPLAINT:  - Ms. Naranjo presents with concerns a recent panic attack at work.    HPI:  Ms. Naranjo reports a panic attack that resulted in disciplinary action at work. She has difficulty performing her job duties due to ongoing ankle issues, including swelling and pain. Her ankle is currently swollen, with significant pain by the end of the day. She has an appointment with an orthopedist later today to address these ankle concerns.    Ms. Naranjo is taking Cymbalta 30 mg twice daily for mood management. She has a history of anxiety and depression affecting her work performance. Ms. Naranjo has difficulty controlling her emotions and composing herself at work.    Ms. Naranjo reports constipation since starting iron supplement, for which she has been taking a stool softener. The stool softener has not been effective yet, as it typically takes about a week to start working.  Her iron levels are low, necessitating a repeat of labs in about 3 months.    Ms. Naranjo canceled a cardiology appointment due to a panic attack, during which she had difficulty standing for a brief period.    Ms. Naranjo denies any bleeding.          Problem List[1]      Past Surgical History:   Procedure Laterality Date     SECTION         Current Medications[2]    Review of Systems   Constitutional:  Negative for appetite change, chills, fatigue and fever.   Respiratory:  Negative for cough and shortness of breath.    Cardiovascular:  Positive for palpitations (with panic episodes). Negative for chest pain.   Gastrointestinal:  Negative for diarrhea, nausea and vomiting.   Genitourinary:  Negative for dysuria, frequency and urgency.   Musculoskeletal:  Positive for arthralgias and joint swelling.   Neurological:  Negative for dizziness, light-headedness and headaches.  "  Psychiatric/Behavioral:  Positive for dysphoric mood. The patient is nervous/anxious.        Objective:   BP (!) 150/84   Pulse 75   Temp 98.6 °F (37 °C) (Tympanic)   Ht 5' 8" (1.727 m)   Wt 100.8 kg (222 lb 3.6 oz)   LMP 12/04/2018 (Exact Date)   SpO2 98%   BMI 33.79 kg/m²      Physical Exam  Constitutional:       General: She is not in acute distress.     Appearance: Normal appearance. She is obese. She is not ill-appearing.   Cardiovascular:      Rate and Rhythm: Normal rate and regular rhythm.      Heart sounds: Murmur heard.      No friction rub. No gallop.   Pulmonary:      Effort: Pulmonary effort is normal. No respiratory distress.      Breath sounds: Normal breath sounds. No wheezing.   Skin:     General: Skin is dry.      Coloration: Skin is not pale.      Findings: No erythema.   Neurological:      Mental Status: She is alert and oriented to person, place, and time.   Psychiatric:         Mood and Affect: Mood is anxious and depressed. Affect is tearful.         Speech: Speech normal.         Behavior: Behavior is cooperative.         Thought Content: Thought content does not include suicidal ideation. Thought content does not include suicidal plan.         Assessment & Plan     Problem List Items Addressed This Visit          Psychiatric    Anxiety and depression    Current Assessment & Plan   Increasing cymbalta. Prn hydroxyzine sent.          Relevant Medications    DULoxetine (CYMBALTA) 60 MG capsule    DULoxetine (CYMBALTA) 30 MG capsule    Moderate episode of recurrent major depressive disorder    Current Assessment & Plan   Cymbalta increased. Continue abilify.          Relevant Medications    DULoxetine (CYMBALTA) 60 MG capsule    DULoxetine (CYMBALTA) 30 MG capsule       Cardiac/Vascular    Essential hypertension - Primary (Chronic)    Current Assessment & Plan   Blood pressure elevated. Starting lisinopril. Follow up in two weeks for nurse visit.          Relevant Medications    " lisinopriL (PRINIVIL,ZESTRIL) 20 MG tablet     Other Visit Diagnoses         Colon cancer screening        Relevant Orders    Ambulatory referral/consult to Endo Procedure       Breast cancer screening by mammogram        Relevant Orders    Mammo Digital Screening Bilat w/ Rohit (XPD)      Cervical cancer screening        Relevant Orders    Ambulatory referral/consult to Gynecology      Panic attack        Relevant Medications    hydrOXYzine HCL (ATARAX) 25 MG tablet        Reschedule with cardio for heart murmur evaluation.     Assessment & Plan    MEDICAL DECISION MAKING:  - Opted for Lisinopril 20 mg for blood pressure control instead of hctz with hx of hypokalemia.  - Increased morning dose of Cymbalta to address ongoing anxiety and depression symptoms.  - Recommend psychiatry consult for further management of Abilify and to address root causes of anxiety.  - Suggested hydroxyzine as needed for acute anxiety episodes.  - Advised on iron supplementation due to low iron levels.    PATIENT EDUCATION:.  - Discussed the importance of regular cancer screenings, including mammograms and colon cancer screening.  - Provided information on the delayed onset of action for stool softeners, typically taking about 1 week to become effective.    ACTION ITEMS/LIFESTYLE:  - Ms. Naranjo to monitor blood pressure regularly and report any low readings.  - Ms. Naranjo to complete ADA (American Disability Act) forms for anxiety and depression accommodations at work.    MEDICATIONS:  - Started Lisinopril 20 mg daily for 30 days for blood pressure control.  - Increased Cymbalta: Take 60 mg (two 30 mg tablets) in the morning and 30 mg in the afternoon for anxiety and depression.  - Started hydroxyzine as needed for panic attacks.  - Continued Albuterol.  - Recommend OTC iron supplement.  - Take stool softener every other day.    ORDERS:  - CBC, CMP ordered for repeat labs in 3 months.  - Mammogram ordered.    REFERRALS:  -  "Referred to Gynecology for annual exam and Pap smear.  - Referred to Psychiatry for medication management and therapy.  - Previously referred to Cardiology, rescheduling needed.    FOLLOW UP:  - Follow up in 2 weeks for blood pressure check with nurse.  - Follow up to schedule appointment for colon cancer screening (colonoscopy).  - Follow up to reschedule missed cardiology appointment.          Follow up in about 2 weeks (around 3/12/2025) for hypertension nurse visit .    Visit today included increased complexity associated with the care of the episodic problem  addressed and managing the longitudinal care of the patient due to the serious and/or complex managed problem(s) .        Portions of this note may have been created with voice recognition software. Occasional "wrong-word" or "sound-a-like" substitutions may have occurred due to the inherent limitations of voice recognition software. Please, read the note carefully and recognize, using context, where substitutions have occurred.       This note was generated with the assistance of ambient listening technology. Verbal consent was obtained by the patient and accompanying visitor(s) for the recording of patient appointment to facilitate this note. I attest to having reviewed and edited the generated note for accuracy, though some syntax or spelling errors may persist. Please contact the author of this note for any clarification.            [1]   Patient Active Problem List  Diagnosis    Acute cystitis    Hypokalemia    Essential hypertension    Anxiety and depression    Elevated liver enzymes    Vaginal bleeding    Moderate episode of recurrent major depressive disorder   [2]   Current Outpatient Medications:     amLODIPine (NORVASC) 10 MG tablet, Take 1 tablet (10 mg total) by mouth once daily., Disp: 90 tablet, Rfl: 1    ARIPiprazole (ABILIFY) 5 MG Tab, Take 1 tablet (5 mg total) by mouth once daily., Disp: 30 tablet, Rfl: 5    cyclobenzaprine (FLEXERIL) 10 " MG tablet, Take 1 tablet (10 mg total) by mouth every evening., Disp: 30 tablet, Rfl: 1    ferrous sulfate (FEOSOL) 325 mg (65 mg iron) Tab tablet, Take 1 tablet (325 mg total) by mouth 2 (two) times daily., Disp: 180 tablet, Rfl: 0    meloxicam (MOBIC) 15 MG tablet, TAKE 1 TABLET BY MOUTH EVERY DAY, Disp: 30 tablet, Rfl: 1    mirtazapine (REMERON) 15 MG tablet, TAKE 1 TABLET BY MOUTH EVERY EVENING., Disp: 90 tablet, Rfl: 1    DULoxetine (CYMBALTA) 30 MG capsule, Take 1 capsule (30 mg total) by mouth every evening., Disp: 30 capsule, Rfl: 11    DULoxetine (CYMBALTA) 60 MG capsule, Take 1 capsule (60 mg total) by mouth once daily., Disp: 30 capsule, Rfl: 11    hydrOXYzine HCL (ATARAX) 25 MG tablet, Take 1 tablet (25 mg total) by mouth 3 (three) times daily as needed (panic attack)., Disp: 30 tablet, Rfl: 0    lisinopriL (PRINIVIL,ZESTRIL) 20 MG tablet, Take 1 tablet (20 mg total) by mouth once daily., Disp: 30 tablet, Rfl: 0    predniSONE (DELTASONE) 20 MG tablet, Take 3 tablets (60 mg total) by mouth once daily. for 5 days, Disp: 15 tablet, Rfl: 0

## 2025-02-27 ENCOUNTER — HOSPITAL ENCOUNTER (OUTPATIENT)
Dept: RADIOLOGY | Facility: HOSPITAL | Age: 55
Discharge: HOME | End: 2025-02-27
Attending: NURSE PRACTITIONER
Payer: COMMERCIAL

## 2025-02-27 DIAGNOSIS — Z12.31 BREAST CANCER SCREENING BY MAMMOGRAM: ICD-10-CM

## 2025-02-27 PROCEDURE — 77067 SCR MAMMO BI INCL CAD: CPT | Mod: 26,,, | Performed by: RADIOLOGY

## 2025-02-27 PROCEDURE — 77063 BREAST TOMOSYNTHESIS BI: CPT | Mod: 26,,, | Performed by: RADIOLOGY

## 2025-02-27 PROCEDURE — 77063 BREAST TOMOSYNTHESIS BI: CPT | Mod: TC,PO

## 2025-02-28 ENCOUNTER — RESULTS FOLLOW-UP (OUTPATIENT)
Dept: INTERNAL MEDICINE | Facility: CLINIC | Age: 55
End: 2025-02-28

## 2025-02-28 DIAGNOSIS — M19.072 OSTEOARTHRITIS OF LEFT ANKLE, UNSPECIFIED OSTEOARTHRITIS TYPE: Primary | ICD-10-CM

## 2025-03-05 ENCOUNTER — TELEPHONE (OUTPATIENT)
Dept: ORTHOPEDICS | Facility: CLINIC | Age: 55
End: 2025-03-05
Payer: COMMERCIAL

## 2025-03-05 ENCOUNTER — PATIENT MESSAGE (OUTPATIENT)
Dept: ORTHOPEDICS | Facility: CLINIC | Age: 55
End: 2025-03-05

## 2025-03-05 ENCOUNTER — OFFICE VISIT (OUTPATIENT)
Dept: INTERNAL MEDICINE | Facility: CLINIC | Age: 55
End: 2025-03-05
Payer: COMMERCIAL

## 2025-03-05 VITALS
SYSTOLIC BLOOD PRESSURE: 130 MMHG | RESPIRATION RATE: 18 BRPM | BODY MASS INDEX: 35.75 KG/M2 | DIASTOLIC BLOOD PRESSURE: 60 MMHG | HEIGHT: 68 IN | HEART RATE: 80 BPM | WEIGHT: 235.88 LBS | TEMPERATURE: 97 F | OXYGEN SATURATION: 99 %

## 2025-03-05 DIAGNOSIS — I10 ESSENTIAL HYPERTENSION: Chronic | ICD-10-CM

## 2025-03-05 DIAGNOSIS — K59.00 CONSTIPATION, UNSPECIFIED CONSTIPATION TYPE: Primary | ICD-10-CM

## 2025-03-05 DIAGNOSIS — F33.1 MODERATE EPISODE OF RECURRENT MAJOR DEPRESSIVE DISORDER: ICD-10-CM

## 2025-03-05 PROCEDURE — 1160F RVW MEDS BY RX/DR IN RCRD: CPT | Mod: CPTII,S$GLB,, | Performed by: NURSE PRACTITIONER

## 2025-03-05 PROCEDURE — 3075F SYST BP GE 130 - 139MM HG: CPT | Mod: CPTII,S$GLB,, | Performed by: NURSE PRACTITIONER

## 2025-03-05 PROCEDURE — 3008F BODY MASS INDEX DOCD: CPT | Mod: CPTII,S$GLB,, | Performed by: NURSE PRACTITIONER

## 2025-03-05 PROCEDURE — G2211 COMPLEX E/M VISIT ADD ON: HCPCS | Mod: S$GLB,,, | Performed by: NURSE PRACTITIONER

## 2025-03-05 PROCEDURE — 1159F MED LIST DOCD IN RCRD: CPT | Mod: CPTII,S$GLB,, | Performed by: NURSE PRACTITIONER

## 2025-03-05 PROCEDURE — 99999 PR PBB SHADOW E&M-EST. PATIENT-LVL V: CPT | Mod: PBBFAC,,, | Performed by: NURSE PRACTITIONER

## 2025-03-05 PROCEDURE — 99213 OFFICE O/P EST LOW 20 MIN: CPT | Mod: S$GLB,,, | Performed by: NURSE PRACTITIONER

## 2025-03-05 PROCEDURE — 4010F ACE/ARB THERAPY RXD/TAKEN: CPT | Mod: CPTII,S$GLB,, | Performed by: NURSE PRACTITIONER

## 2025-03-05 PROCEDURE — 3078F DIAST BP <80 MM HG: CPT | Mod: CPTII,S$GLB,, | Performed by: NURSE PRACTITIONER

## 2025-03-05 RX ORDER — POLYETHYLENE GLYCOL 3350 17 G/17G
17 POWDER, FOR SOLUTION ORAL DAILY
Qty: 510 G | Refills: 0 | Status: SHIPPED | OUTPATIENT
Start: 2025-03-05 | End: 2025-04-04

## 2025-03-05 RX ORDER — SYRING-NEEDL,DISP,INSUL,0.3 ML 29 G X1/2"
296 SYRINGE, EMPTY DISPOSABLE MISCELLANEOUS ONCE
Qty: 296 ML | Refills: 0 | Status: SHIPPED | OUTPATIENT
Start: 2025-03-05 | End: 2025-03-05

## 2025-03-05 NOTE — PROGRESS NOTES
Subjective:       Patient ID: Carla Naranjo is a 54 y.o. female.    Chief Complaint: Constipation    History of Present Illness    HPI:  Ms. Naranjo reports severe constipation that began after starting an iron supplement for iron deficiency approximately 1 month ago. She has not had a bowel movement in 3 days and describes her constipation as severely problematic. She has worsening abdominal cramping, bloating, and feels full. She has been taking a prescribed stool softener without effect. She is concerned about the constipation interfering with her work at [employer], which she is scheduled to return to tomorrow.    She was evaluated by her PCP on . At that time, her blood pressure medication was increased, and her depression medication (Cymbalta) was adjusted. She is still taking her mood stabilizer and Abilify. Her blood pressure was good today, which is unusual for her as it is typically high when she comes in.    She has been taking the iron supplement daily and is unsure if she should stop taking it due to the constipation. She desires relief but is concerned about taking anything that might cause frequent bathroom visits, especially when she returns to work.          HPI    Problem List[1]    Family History   Problem Relation Name Age of Onset    Muscular dystrophy Paternal Grandmother      Suicide Paternal Grandmother      Alcohol abuse Father      Drug abuse Father      No Known Problems Mother      Pneumonia Maternal Grandmother      Alzheimer's disease Maternal Grandfather      Heart disease Paternal Grandfather       Past Surgical History:   Procedure Laterality Date     SECTION         Current Medications[2]    Review of Systems   Constitutional:  Negative for appetite change, chills, fatigue and fever.   Respiratory:  Negative for cough and shortness of breath.    Cardiovascular:  Negative for chest pain.   Gastrointestinal:  Positive for abdominal pain and constipation.  "Negative for blood in stool, diarrhea, nausea, rectal pain and vomiting.   Genitourinary:  Negative for dysuria, frequency and urgency.   Musculoskeletal:  Positive for arthralgias and joint swelling.   Neurological:  Negative for dizziness, light-headedness and headaches.   Psychiatric/Behavioral:  Positive for depression and dysphoric mood. The patient is nervous/anxious.        Objective:   /60   Pulse 80   Temp 97.2 °F (36.2 °C) (Tympanic)   Resp 18   Ht 5' 8" (1.727 m)   Wt 107 kg (235 lb 14.3 oz)   LMP 12/04/2018 (Exact Date)   SpO2 99%   BMI 35.87 kg/m²      Physical Exam  Vitals reviewed.   Constitutional:       General: She is not in acute distress.     Appearance: Normal appearance. She is obese. She is not ill-appearing, toxic-appearing or diaphoretic.   Eyes:      Conjunctiva/sclera: Conjunctivae normal.   Cardiovascular:      Rate and Rhythm: Regular rhythm.      Heart sounds: Murmur heard.   Pulmonary:      Effort: Pulmonary effort is normal. No respiratory distress.      Breath sounds: Normal breath sounds. No wheezing, rhonchi or rales.   Abdominal:      General: Bowel sounds are normal. There is no distension.      Palpations: Abdomen is soft. There is no mass.      Tenderness: There is no abdominal tenderness. There is no guarding or rebound.   Neurological:      Mental Status: She is alert and oriented to person, place, and time.      Gait: Gait normal.   Psychiatric:         Behavior: Behavior normal.         Assessment & Plan     Assessment & Plan    CONSTIPATION:  - Assessed the patient's constipation, likely due to recently initiated iron supplementation.  - Determined the need for immediate relief of constipation due to 3-day absence of bowel movement.  - Deferred abdominal x-ray, given soft abdomen, active bowel sounds, and absence of significant tenderness on physical exam.  - Educated the patient on the difference between stool softeners and laxatives, and the expected " timeline for effectiveness of different constipation treatments.  - Instructed the patient to take magnesium citrate (OTC) immediately for acute constipation relief.  - Recommend starting Miralax (OTC) daily or every other day for ongoing constipation management.  - Advised to continue stool softeners.  - Instructed the patient to contact the office if no relief is obtained.  - Counseled the patient to stay hydrated and increase fiber intake.  - Discussed importance of hydration and fiber intake for regular bowel movements.    MEDICATIONS/SUPPLEMENTS:  - Instructed the patient to continue daily iron supplementation.    HYPERTENSION:  - Noted that the patient's blood pressure was in normal rang.  - Confirmed improvement in blood pressure with recent medication increase.    DEPRESSION:  - Instructed the patient to continue Cymbalta at increased dose (2 in the morning, 1 at night).  - Advised to continue mood stabilizer.             1. Constipation, unspecified constipation type  -     magnesium citrate solution; Take 296 mLs by mouth once. for 1 dose  Dispense: 296 mL; Refill: 0  -     polyethylene glycol (GLYCOLAX) 17 gram/dose powder; Take 17 g by mouth once daily.  Dispense: 510 g; Refill: 0    2. Essential hypertension    3. Moderate episode of recurrent major depressive disorder      Visit today included increased complexity associated with the care of the episodic problem addressed and managing the longitudinal care of the patient due to the serious and/or complex managed problem(s).        TAYO Whitfield    This note was generated with the assistance of ambient listening technology. Verbal consent was obtained by the patient and accompanying visitor(s) for the recording of patient appointment to facilitate this note. I attest to having reviewed and edited the generated note for accuracy, though some syntax or spelling errors may persist. Please contact the author of this note for any clarification.    "    Portions of this note may have been created with voice recognition software. Occasional "wrong-word" or "sound-a-like" substitutions may have occurred due to the inherent limitations of voice recognition software. Please, read the note carefully and recognize, using context, where substitutions have occurred.             [1]   Patient Active Problem List  Diagnosis    Acute cystitis    Hypokalemia    Essential hypertension    Anxiety and depression    Elevated liver enzymes    Vaginal bleeding    Moderate episode of recurrent major depressive disorder   [2]   Current Outpatient Medications:     amLODIPine (NORVASC) 10 MG tablet, Take 1 tablet (10 mg total) by mouth once daily., Disp: 90 tablet, Rfl: 1    ARIPiprazole (ABILIFY) 5 MG Tab, Take 1 tablet (5 mg total) by mouth once daily., Disp: 30 tablet, Rfl: 5    DULoxetine (CYMBALTA) 30 MG capsule, Take 1 capsule (30 mg total) by mouth every evening., Disp: 30 capsule, Rfl: 11    DULoxetine (CYMBALTA) 60 MG capsule, Take 1 capsule (60 mg total) by mouth once daily., Disp: 30 capsule, Rfl: 11    ferrous sulfate (FEOSOL) 325 mg (65 mg iron) Tab tablet, Take 1 tablet (325 mg total) by mouth 2 (two) times daily., Disp: 180 tablet, Rfl: 0    hydrOXYzine HCL (ATARAX) 25 MG tablet, Take 1 tablet (25 mg total) by mouth 3 (three) times daily as needed (panic attack)., Disp: 30 tablet, Rfl: 0    lisinopriL (PRINIVIL,ZESTRIL) 20 MG tablet, Take 1 tablet (20 mg total) by mouth once daily., Disp: 30 tablet, Rfl: 0    meloxicam (MOBIC) 15 MG tablet, TAKE 1 TABLET BY MOUTH EVERY DAY, Disp: 30 tablet, Rfl: 1    mirtazapine (REMERON) 15 MG tablet, TAKE 1 TABLET BY MOUTH EVERY EVENING., Disp: 90 tablet, Rfl: 1    polyethylene glycol (GLYCOLAX) 17 gram/dose powder, Take 17 g by mouth once daily., Disp: 510 g, Rfl: 0    "

## 2025-03-05 NOTE — LETTER
March 5, 2025      [facility]  [address]  [facility] [place] [address]  Phone: [phone]  Fax: [phone]       Patient: Carla Naranjo   YOB: 1970  Date of Visit: 03/05/2025    To Whom It May Concern:    Nito Naranjo  was at [facility] on 03/05/2025. The patient may return to work/school on 3/6/2025 with no restrictions. If you have any questions or concerns, or if I can be of further assistance, please do not hesitate to contact me.    Sincerely,      TAYO Hinton

## 2025-03-20 DIAGNOSIS — I10 ESSENTIAL HYPERTENSION: Chronic | ICD-10-CM

## 2025-03-20 RX ORDER — LISINOPRIL 20 MG/1
20 TABLET ORAL
Qty: 90 TABLET | Refills: 1 | Status: SHIPPED | OUTPATIENT
Start: 2025-03-20

## 2025-03-23 ENCOUNTER — PATIENT MESSAGE (OUTPATIENT)
Dept: SPORTS MEDICINE | Facility: CLINIC | Age: 55
End: 2025-03-23
Payer: COMMERCIAL

## 2025-03-24 DIAGNOSIS — M75.101 TEAR OF RIGHT ROTATOR CUFF, UNSPECIFIED TEAR EXTENT, UNSPECIFIED WHETHER TRAUMATIC: Primary | ICD-10-CM

## 2025-03-24 RX ORDER — CYCLOBENZAPRINE HCL 10 MG
10 TABLET ORAL NIGHTLY
Qty: 30 TABLET | Refills: 1 | Status: SHIPPED | OUTPATIENT
Start: 2025-03-24 | End: 2025-05-23

## 2025-03-26 DIAGNOSIS — M75.121 COMPLETE TEAR OF RIGHT ROTATOR CUFF, UNSPECIFIED WHETHER TRAUMATIC: ICD-10-CM

## 2025-03-26 DIAGNOSIS — M19.019 OSTEOARTHRITIS OF ACROMIOCLAVICULAR JOINT: ICD-10-CM

## 2025-03-26 RX ORDER — MELOXICAM 15 MG/1
15 TABLET ORAL
Qty: 30 TABLET | Refills: 1 | Status: SHIPPED | OUTPATIENT
Start: 2025-03-26

## 2025-03-28 ENCOUNTER — PATIENT MESSAGE (OUTPATIENT)
Dept: CARDIOLOGY | Facility: HOSPITAL | Age: 55
End: 2025-03-28
Payer: COMMERCIAL

## 2025-03-28 ENCOUNTER — OFFICE VISIT (OUTPATIENT)
Dept: INTERNAL MEDICINE | Facility: CLINIC | Age: 55
End: 2025-03-28
Payer: COMMERCIAL

## 2025-03-28 VITALS
WEIGHT: 232.81 LBS | TEMPERATURE: 98 F | HEART RATE: 83 BPM | BODY MASS INDEX: 35.28 KG/M2 | OXYGEN SATURATION: 98 % | SYSTOLIC BLOOD PRESSURE: 134 MMHG | HEIGHT: 68 IN | DIASTOLIC BLOOD PRESSURE: 68 MMHG

## 2025-03-28 DIAGNOSIS — I10 ESSENTIAL HYPERTENSION: ICD-10-CM

## 2025-03-28 DIAGNOSIS — R01.1 HEART MURMUR: ICD-10-CM

## 2025-03-28 DIAGNOSIS — M19.072 OSTEOARTHRITIS OF LEFT ANKLE, UNSPECIFIED OSTEOARTHRITIS TYPE: ICD-10-CM

## 2025-03-28 DIAGNOSIS — T22.212A PARTIAL THICKNESS BURN OF LEFT FOREARM, INITIAL ENCOUNTER: Primary | ICD-10-CM

## 2025-03-28 PROCEDURE — 99999 PR PBB SHADOW E&M-EST. PATIENT-LVL V: CPT | Mod: PBBFAC,,, | Performed by: NURSE PRACTITIONER

## 2025-03-28 RX ORDER — MUPIROCIN 20 MG/G
OINTMENT TOPICAL 3 TIMES DAILY
Qty: 22 G | Refills: 0 | Status: SHIPPED | OUTPATIENT
Start: 2025-03-28
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
## 2025-03-28 NOTE — LETTER
March 28, 2025      [facility]  [address]  [place] [address]  Phone: [phone]  Fax: [phone]       Patient: Carla Naranjo   YOB: 1970  Date of Visit: 03/28/2025    To Whom It May Concern:    Nito Naranjo  was at [facility] on 03/28/2025. The patient may return to work/school on 03/31/2025 with no restrictions. If you have any questions or concerns, or if I can be of further assistance, please do not hesitate to contact me.    Sincerely,      Carmencita Tao NP

## 2025-04-24 NOTE — PROGRESS NOTES
"            [address] [facility] [place] [address]   Phone [phone]  Fax [phone]           CHIEF COMPLAINT: Pain of the Left Ankle       HISTORY OF PRESENT ILLNESS (JN) (04/28/2025):    History of Present Illness    HPI:  Carla presents with severe left ankle arthritis causing significant pain and difficulty walking.  She is referred to me by Dr. Castellanos.  Pain worsens throughout the day, subsides at night, and recurs the next day. By the end of the day, she can barely walk. She has not received any ankle injections before.    She saw an orthopedic specialist, Mr. Castellanos, who examined her XR and determined it was "bone on bone." She currently takes meloxicam daily and flexeril at night for pain management, along with applying Voltaren cream 3-4 times daily. However, these medications are no longer effective.  Her pain is 7/10 today.  She reports taking the medication daily but finds it extremely ineffective, especially in the afternoon after work when the pain becomes severe.  She works at [employer] and stands to bring orders out.    She expresses hope for relief and is seeking alternative treatment options to improve mobility and reduce pain.    She denies any previous injuries to her ankle.          Disclaimer: The history above was obtained through ambient listening technology thus may contain errors.     PAST MEDICAL HISTORY:    Past Medical History:   Diagnosis Date    DANIELA (acute kidney injury) 11/27/2018    Anxiety     Depression     Duodenitis without bleeding 11/27/2018    History of opioid abuse     Hypertension     Hyponatremia and hypokalemia 11/27/2018    Personality disorder        Hemoglobin A1C   Date Value Ref Range Status   11/26/2018 5.5 4.0 - 5.6 % Final     Comment:     ADA Screening Guidelines:  5.7-6.4%  Consistent with prediabetes  >or=6.5%  Consistent with diabetes  High levels of fetal hemoglobin interfere with the HbA1C  assay. Heterozygous hemoglobin variants " "(HbS, HgC, etc)do  not significantly interfere with this assay.   However, presence of multiple variants may affect accuracy.          PAST SURGICAL HISTORY:    Past Surgical History:   Procedure Laterality Date     SECTION          MEDICATIONS:  Current Medications[1]     There are no discontinued medications.      ALLERGIES:     Review of patient's allergies indicates:  No Known Allergies         FAMILY HISTORY:   Family History   Problem Relation Name Age of Onset    Muscular dystrophy Paternal Grandmother      Suicide Paternal Grandmother      Alcohol abuse Father      Drug abuse Father      No Known Problems Mother      Pneumonia Maternal Grandmother      Alzheimer's disease Maternal Grandfather      Heart disease Paternal Grandfather             SOCIAL HISTORY:    Social History     Socioeconomic History    Marital status:    Tobacco Use    Smoking status: Never    Smokeless tobacco: Current    Tobacco comments:     vape   Substance and Sexual Activity    Alcohol use: Yes     Comment: once every 3 months     Drug use: No    Sexual activity: Yes     Partners: Male       PHYSICAL EXAMINATION:     Estimated body mass index is 35.4 kg/m² as calculated from the following:    Height as of this encounter: 5' 8" (1.727 m).    Weight as of this encounter: 105.6 kg (232 lb 12.9 oz).   ASSISTIVE DEVICE:  None    MUSCULOSKELETAL:    Ankle Exam (affected extremity):   Inspection:         Gross deformity   (-)         Erythema   (-)        Ecchymosis   (-)          Swelling   (+) tibiotalar joint           Open wounds   (-)         Surgical scars   (-)                    Standing alignment:  Neutral   Palpation tenderness:  Bony:  Hindfoot:  Proximal fibula  (-)  Calcaneus  (-)   Distal fibula  (-)  Talus   (-)   Medial malleolus  (-)  CC joint/cuboid  (-)   Tibiotalar joint  (+)  Lateral gutter  (+)  Plantar fascia  (-)  Medial gutter  (+)   Midfoot:   TN joint   (-)   NC joints   (-)   TMT joints   (-) "   Forefoot:   (-)      Soft tissue:     Tendons:    Achilles midsubstance (-)  Peroneal tendons  (-)   Achilles insertion  (-)  Posterior tibial tendon (-)   Retrocalcaneal bursa (-)  Anterior tibial tendon  (-)   Ligaments:   ATFL   (-)  Deltoid   (-)   CFL   (-)  Syndesmosis  (-)  ROM:  Affected Ankle:         DF:    10°        PF:    30°                 Pain    (+)       Crepitance   (+)       Sensory:  Normal sensation to light touch in Sa/Adames/DP/SP/T nerve distributions      Motor:               Fires EHL/FHL/Tibialis anterior/Gastrocsoleus   Vascular:  Foot WWP with brisk capillary refill    DP/PT pulses palpable  Special Tests:  Bettencourt   (-)  Anterior drawer   (-)  Calcaneal squeeze  (-)  Syndesmotic squeeze  (-)        IMAGING:      X-Ray Foot Complete Left  Narrative: EXAMINATION:  XR FOOT COMPLETE 3 VIEW LEFT    CLINICAL HISTORY:  Primary osteoarthritis, left ankle and foot    TECHNIQUE:  XR FOOT COMPLETE 3 VIEW LEFT    COMPARISON:  01/30/2025.    FINDINGS:  No evidence of acute fracture.  Severe degenerative changes of the tibiotalar joint.  Hallux valgus.  Severe degenerative changes of the 1st MTP joint.  Dorsal calcaneal spurring.  Midfoot hypertrophy.  Soft tissue swelling around the ankle.  Dorsal calcaneal spurring.  Impression: As above.    Electronically signed by: Ayo Agee  Date:    04/28/2025  Time:    12:14  X-Ray Ankle Complete Left  Narrative: EXAMINATION:  XR ANKLE COMPLETE 3 VIEW LEFT    CLINICAL HISTORY:  Primary osteoarthritis, left ankle and foot    TECHNIQUE:  XR ANKLE COMPLETE 3 VIEW LEFT    COMPARISON:  01/30/2025.    FINDINGS:  Redemonstration of the advanced degenerative changes and irregularity of the tibiotalar joint.  Well corticated densities project distal to the medial malleolus and deformities of the distal tibia/fibula suggestive of prior trauma.  Soft tissue swelling around the ankle.  Midfoot hypertrophy.  Calcaneal spurring.  Impression: As above.    Electronically  signed by: Ayo Agee  Date:    04/28/2025  Time:    12:13         ASSESSMENT: 54 y.o. female  with:   Severe left tibiotalar arthritis  Status post tibiotalar steroid injection 04/28/2025      PLAN:  Data:  I independently visualized images including but not limited to xrays, MRI, or CT scan today  I reviewed available old/outside records  PT/OT:  Not helpful for this degree of arthritis  Medications:  On meloxicam by other provider.  History of acute kidney injury in duodenitis  Also utilizing topical Voltaren  DME and weightbearing status:  Activities as tolerated  Limit pounding on her ankle for exercise.  Lace-up ankle brace prescribed today. 15 minutes were spent sizing, fitting, and educating for durable medical equipment application today.  [id number].  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  She was given a work note for today.  Education:  I had a long discussion with the patient about the causes, treatments, and prognosis/natural history for this condition.  We discussed effective ways to improve symptoms as well as what types of activities may make the symptoms/prognosis worse. We discussed signs and symptoms and other reasons to return to clinic sooner.  Return to clinic:  Eight weeks  Images needed at next visit:  No x-rays    This note was generated with the assistance of ambient listening technology thus some errors may exist.  Please contact the author of this note for any clarification.           Physician Signature: Monica Ohara M.D.       Official Website  Schedule An Appointment            [1]   Current Outpatient Medications:     amLODIPine (NORVASC) 10 MG tablet, Take 1 tablet (10 mg total) by mouth once daily., Disp: 90 tablet, Rfl: 1    ARIPiprazole  (ABILIFY) 5 MG Tab, Take 1 tablet (5 mg total) by mouth once daily., Disp: 30 tablet, Rfl: 5    cyclobenzaprine (FLEXERIL) 10 MG tablet, Take 1 tablet (10 mg total) by mouth every evening., Disp: 30 tablet, Rfl: 1    DULoxetine (CYMBALTA) 30 MG capsule, Take 1 capsule (30 mg total) by mouth every evening., Disp: 30 capsule, Rfl: 11    DULoxetine (CYMBALTA) 60 MG capsule, Take 1 capsule (60 mg total) by mouth once daily., Disp: 30 capsule, Rfl: 11    ferrous sulfate (FEOSOL) 325 mg (65 mg iron) Tab tablet, Take 1 tablet (325 mg total) by mouth 2 (two) times daily., Disp: 180 tablet, Rfl: 0    hydrOXYzine HCL (ATARAX) 25 MG tablet, Take 1 tablet (25 mg total) by mouth 3 (three) times daily as needed (panic attack)., Disp: 30 tablet, Rfl: 0    lisinopriL (PRINIVIL,ZESTRIL) 20 MG tablet, TAKE 1 TABLET BY MOUTH EVERY DAY, Disp: 90 tablet, Rfl: 1    meloxicam (MOBIC) 15 MG tablet, TAKE 1 TABLET BY MOUTH EVERY DAY, Disp: 30 tablet, Rfl: 1    mirtazapine (REMERON) 15 MG tablet, TAKE 1 TABLET BY MOUTH EVERY EVENING., Disp: 90 tablet, Rfl: 1    mupirocin (BACTROBAN) 2 % ointment, Apply topically 3 (three) times daily. (Patient not taking: Reported on 4/28/2025), Disp: 22 g, Rfl: 0

## 2025-04-28 ENCOUNTER — OFFICE VISIT (OUTPATIENT)
Dept: ORTHOPEDICS | Facility: CLINIC | Age: 55
End: 2025-04-28
Payer: COMMERCIAL

## 2025-04-28 ENCOUNTER — HOSPITAL ENCOUNTER (OUTPATIENT)
Dept: RADIOLOGY | Facility: HOSPITAL | Age: 55
Discharge: HOME | End: 2025-04-28
Attending: ORTHOPAEDIC SURGERY
Payer: COMMERCIAL

## 2025-04-28 VITALS — HEIGHT: 68 IN | BODY MASS INDEX: 35.28 KG/M2 | WEIGHT: 232.81 LBS

## 2025-04-28 DIAGNOSIS — M25.572 LEFT ANKLE PAIN: ICD-10-CM

## 2025-04-28 DIAGNOSIS — M19.072 ARTHRITIS OF LEFT ANKLE: Primary | ICD-10-CM

## 2025-04-28 DIAGNOSIS — M19.072 OSTEOARTHRITIS OF LEFT ANKLE, UNSPECIFIED OSTEOARTHRITIS TYPE: ICD-10-CM

## 2025-04-28 PROCEDURE — 73630 X-RAY EXAM OF FOOT: CPT | Mod: 26,LT,, | Performed by: STUDENT IN AN ORGANIZED HEALTH CARE EDUCATION/TRAINING PROGRAM

## 2025-04-28 PROCEDURE — 99999 PR PBB SHADOW E&M-EST. PATIENT-LVL III: CPT | Mod: PBBFAC,,, | Performed by: ORTHOPAEDIC SURGERY

## 2025-04-28 PROCEDURE — 73610 X-RAY EXAM OF ANKLE: CPT | Mod: TC,LT

## 2025-04-28 PROCEDURE — 73610 X-RAY EXAM OF ANKLE: CPT | Mod: 26,LT,, | Performed by: STUDENT IN AN ORGANIZED HEALTH CARE EDUCATION/TRAINING PROGRAM

## 2025-04-28 PROCEDURE — 73630 X-RAY EXAM OF FOOT: CPT | Mod: TC,LT

## 2025-04-28 NOTE — LETTER
April 28, 2025      The [facility]  [address] [facility]  [place] [address]  Phone: [phone]  Fax: [phone]       Patient: Carla Naranjo   YOB: 1970  Date of Visit: 04/28/2025    To Whom It May Concern:    Nito Naranjo  was at [facility] on 04/28/2025. The patient may return to work/school on 4/29/2025 with no restrictions. If you have any questions or concerns, or if I can be of further assistance, please do not hesitate to contact me.    Sincerely,             Physician Signature: Monica Ohara M.D.

## 2025-04-28 NOTE — PROCEDURES
Intermediate Joint Aspiration/Injection: L ankle    Date/Time: 4/28/2025 10:40 AM    Performed by: Monica Ohara MD  Authorized by: Monica Ohara MD    Consent Done?:  Yes (Verbal)  Indications:  Arthritis  Site marked: The procedure site was marked    Timeout: Prior to procedure the correct patient, procedure, and site was verified      Location:  Ankle  Site:  L ankle  Ultrasonic Guidance for needle placement: No  Needle size:  22 G  Approach:  Anteromedial  Medications:  6 mg betamethasone acetate-betamethasone sodium phosphate 6 mg/mL  Patient tolerance:  Patient tolerated the procedure well with no immediate complications

## 2025-04-28 NOTE — LETTER
April 28, 2025      The [facility]  [address] [facility]  [place] [address]  Phone: [phone]  Fax: [phone]       Patient: Carla Naranjo   YOB: 1970  Date of Visit: 04/28/2025    To Whom It May Concern:    Nito Naranjo  was at [facility] on 04/28/2025. The patient may return to work/school on 4/29/2025 with no restrictions. If you have any questions or concerns, or if I can be of further assistance, please do not hesitate to contact me.    Sincerely,    Bisi Kelley MA

## 2025-04-29 RX ORDER — BETAMETHASONE SODIUM PHOSPHATE AND BETAMETHASONE ACETATE 3; 3 MG/ML; MG/ML
6 INJECTION, SUSPENSION INTRA-ARTICULAR; INTRALESIONAL; INTRAMUSCULAR; SOFT TISSUE
Status: DISCONTINUED | OUTPATIENT
Start: 2025-04-28 | End: 2025-04-29

## 2025-05-14 DIAGNOSIS — D50.9 IRON DEFICIENCY ANEMIA, UNSPECIFIED IRON DEFICIENCY ANEMIA TYPE: ICD-10-CM

## 2025-05-14 RX ORDER — FERROUS SULFATE 325(65) MG
TABLET ORAL 2 TIMES DAILY
Qty: 180 TABLET | Refills: 0 | Status: SHIPPED | OUTPATIENT
Start: 2025-05-14

## 2025-05-18 DIAGNOSIS — M75.101 TEAR OF RIGHT ROTATOR CUFF, UNSPECIFIED TEAR EXTENT, UNSPECIFIED WHETHER TRAUMATIC: ICD-10-CM

## 2025-05-19 RX ORDER — CYCLOBENZAPRINE HCL 10 MG
10 TABLET ORAL NIGHTLY
Qty: 30 TABLET | Refills: 1 | Status: SHIPPED | OUTPATIENT
Start: 2025-05-19

## 2025-05-24 DIAGNOSIS — I10 ESSENTIAL HYPERTENSION: ICD-10-CM

## 2025-05-26 RX ORDER — AMLODIPINE BESYLATE 10 MG/1
10 TABLET ORAL
Qty: 90 TABLET | Refills: 1 | Status: SHIPPED | OUTPATIENT
Start: 2025-05-26

## 2025-06-16 ENCOUNTER — PATIENT MESSAGE (OUTPATIENT)
Dept: ORTHOPEDICS | Facility: CLINIC | Age: 55
End: 2025-06-16
Payer: COMMERCIAL

## 2025-06-16 ENCOUNTER — TELEPHONE (OUTPATIENT)
Dept: ORTHOPEDICS | Facility: CLINIC | Age: 55
End: 2025-06-16
Payer: COMMERCIAL

## 2025-06-16 DIAGNOSIS — M25.511 RIGHT SHOULDER PAIN, UNSPECIFIED CHRONICITY: ICD-10-CM

## 2025-06-16 DIAGNOSIS — M25.572 LEFT ANKLE PAIN: Primary | ICD-10-CM

## 2025-06-16 NOTE — TELEPHONE ENCOUNTER
Left patient voicemail that we will be sending refill and were happy to see her for shoulder. Also informed her that we need to reschedule her upcoming appointment and asked for her to call back.     ----- Message from Ángel Douglas sent at 6/16/2025  3:31 PM CDT -----  Regarding: Shared patient with Dr. Castellanos  Good afternoon,    Patient reached out to Dr. Castellanos's office wanting to know if we could refill her Meloxicam. Explained to her that he has left [facility] and she was wanting to know if Dr. Ohara would refill the medication since she sees her for her ankle?  Also was inquiring if Dr. Ohara would repeat a cortisone injection in her shoulder when it comes time to repeat.  Told patient that I would reach out and ask since if that is an area she would like to treat or if we need to get her set up with P[facility] at [facility]. Can you let me know if Dr. Ohara will renew the Meloxicam and if she will see her for her shoulder please?    Thanks,    Stella

## 2025-06-16 NOTE — TELEPHONE ENCOUNTER
Patient contacted Dr. Castellanos's office regarding prescription refill denial and who to see for continued care for her shoulder.  Explained to patient that Dr. Castellanos is no longer affiliated with [facility] and we will be unable to renew prescriptions under his care.  Offered to get patient scheduled with another provider at [facility] for her shoulder, to establish care.  Patient asked if Dr. Ohara would be able to refill since she has been under her care for her foot and if she would be able to take over care of her shoulder when it comes time for an injection.  Offered to reach out to Dr. Ohara's staff regarding patient requests and will get back with her when we hear something.

## 2025-06-17 ENCOUNTER — PATIENT MESSAGE (OUTPATIENT)
Dept: ORTHOPEDICS | Facility: CLINIC | Age: 55
End: 2025-06-17
Payer: COMMERCIAL

## 2025-06-17 RX ORDER — MELOXICAM 15 MG/1
15 TABLET ORAL EVERY OTHER DAY
Qty: 15 TABLET | Refills: 0 | Status: SHIPPED | OUTPATIENT
Start: 2025-06-17

## 2025-06-19 DIAGNOSIS — F33.1 MODERATE EPISODE OF RECURRENT MAJOR DEPRESSIVE DISORDER: ICD-10-CM

## 2025-06-19 RX ORDER — MIRTAZAPINE 15 MG/1
15 TABLET, FILM COATED ORAL NIGHTLY
Qty: 90 TABLET | Refills: 1 | Status: SHIPPED | OUTPATIENT
Start: 2025-06-19

## 2025-06-24 ENCOUNTER — HOSPITAL ENCOUNTER (EMERGENCY)
Facility: HOSPITAL | Age: 55
Discharge: HOME | End: 2025-06-24
Attending: EMERGENCY MEDICINE
Payer: COMMERCIAL

## 2025-06-24 VITALS
DIASTOLIC BLOOD PRESSURE: 62 MMHG | OXYGEN SATURATION: 100 % | BODY MASS INDEX: 35.18 KG/M2 | TEMPERATURE: 98 F | SYSTOLIC BLOOD PRESSURE: 108 MMHG | HEART RATE: 74 BPM | WEIGHT: 231.38 LBS | RESPIRATION RATE: 19 BRPM

## 2025-06-24 DIAGNOSIS — R11.0 NAUSEA: ICD-10-CM

## 2025-06-24 DIAGNOSIS — E86.0 DEHYDRATION: Primary | ICD-10-CM

## 2025-06-24 DIAGNOSIS — N17.9 AKI (ACUTE KIDNEY INJURY): ICD-10-CM

## 2025-06-24 LAB
ABSOLUTE EOSINOPHIL (OHS): 0.13 K/UL
ABSOLUTE MONOCYTE (OHS): 0.74 K/UL (ref 0.3–1)
ABSOLUTE NEUTROPHIL COUNT (OHS): 6.64 K/UL (ref 1.8–7.7)
ALBUMIN SERPL BCP-MCNC: 4 G/DL (ref 3.5–5.2)
ALP SERPL-CCNC: 97 UNIT/L (ref 40–150)
ALT SERPL W/O P-5'-P-CCNC: 20 UNIT/L (ref 10–44)
ANION GAP (OHS): 13 MMOL/L (ref 8–16)
AST SERPL-CCNC: 18 UNIT/L (ref 11–45)
BASOPHILS # BLD AUTO: 0.05 K/UL
BASOPHILS NFR BLD AUTO: 0.6 %
BILIRUB SERPL-MCNC: 0.3 MG/DL (ref 0.1–1)
BNP SERPL-MCNC: 29 PG/ML (ref 0–99)
BUN SERPL-MCNC: 27 MG/DL (ref 6–20)
CALCIUM SERPL-MCNC: 8.9 MG/DL (ref 8.7–10.5)
CHLORIDE SERPL-SCNC: 106 MMOL/L (ref 95–110)
CK SERPL-CCNC: 207 U/L (ref 20–180)
CO2 SERPL-SCNC: 18 MMOL/L (ref 23–29)
CREAT SERPL-MCNC: 1.8 MG/DL (ref 0.5–1.4)
ERYTHROCYTE [DISTWIDTH] IN BLOOD BY AUTOMATED COUNT: 13.2 % (ref 11.5–14.5)
GFR SERPLBLD CREATININE-BSD FMLA CKD-EPI: 33 ML/MIN/1.73/M2
GLUCOSE SERPL-MCNC: 119 MG/DL (ref 70–110)
HCT VFR BLD AUTO: 32.5 % (ref 37–48.5)
HCV AB SERPL QL IA: NEGATIVE
HGB BLD-MCNC: 11 GM/DL (ref 12–16)
HIV 1+2 AB+HIV1 P24 AG SERPL QL IA: NEGATIVE
HOLD SPECIMEN: NORMAL
IMM GRANULOCYTES # BLD AUTO: 0.02 K/UL (ref 0–0.04)
IMM GRANULOCYTES NFR BLD AUTO: 0.2 % (ref 0–0.5)
LYMPHOCYTES # BLD AUTO: 1.23 K/UL (ref 1–4.8)
MCH RBC QN AUTO: 30.1 PG (ref 27–31)
MCHC RBC AUTO-ENTMCNC: 33.8 G/DL (ref 32–36)
MCV RBC AUTO: 89 FL (ref 82–98)
NUCLEATED RBC (/100WBC) (OHS): 0 /100 WBC
OHS QRS DURATION: 78 MS
OHS QTC CALCULATION: 444 MS
PLATELET # BLD AUTO: 286 K/UL (ref 150–450)
PMV BLD AUTO: 10.7 FL (ref 9.2–12.9)
POTASSIUM SERPL-SCNC: 4 MMOL/L (ref 3.5–5.1)
PROT SERPL-MCNC: 7.2 GM/DL (ref 6–8.4)
RBC # BLD AUTO: 3.66 M/UL (ref 4–5.4)
RELATIVE EOSINOPHIL (OHS): 1.5 %
RELATIVE LYMPHOCYTE (OHS): 14 % (ref 18–48)
RELATIVE MONOCYTE (OHS): 8.4 % (ref 4–15)
RELATIVE NEUTROPHIL (OHS): 75.3 % (ref 38–73)
SODIUM SERPL-SCNC: 137 MMOL/L (ref 136–145)
TROPONIN I SERPL DL<=0.01 NG/ML-MCNC: <0.006 NG/ML
TROPONIN I SERPL DL<=0.01 NG/ML-MCNC: <0.006 NG/ML
WBC # BLD AUTO: 8.81 K/UL (ref 3.9–12.7)

## 2025-06-24 PROCEDURE — 83880 ASSAY OF NATRIURETIC PEPTIDE: CPT | Mod: ER | Performed by: PHYSICIAN ASSISTANT

## 2025-06-24 PROCEDURE — 93005 ELECTROCARDIOGRAM TRACING: CPT | Mod: ER

## 2025-06-24 PROCEDURE — 93010 ELECTROCARDIOGRAM REPORT: CPT | Mod: ,,, | Performed by: INTERNAL MEDICINE

## 2025-06-24 PROCEDURE — 99285 EMERGENCY DEPT VISIT HI MDM: CPT | Mod: 25,ER

## 2025-06-24 PROCEDURE — 80053 COMPREHEN METABOLIC PANEL: CPT | Mod: ER | Performed by: PHYSICIAN ASSISTANT

## 2025-06-24 PROCEDURE — 25000003 PHARM REV CODE 250: Mod: ER | Performed by: PHYSICIAN ASSISTANT

## 2025-06-24 PROCEDURE — 82550 ASSAY OF CK (CPK): CPT | Mod: ER | Performed by: PHYSICIAN ASSISTANT

## 2025-06-24 PROCEDURE — 84484 ASSAY OF TROPONIN QUANT: CPT | Mod: ER | Performed by: PHYSICIAN ASSISTANT

## 2025-06-24 PROCEDURE — 85025 COMPLETE CBC W/AUTO DIFF WBC: CPT | Mod: ER | Performed by: PHYSICIAN ASSISTANT

## 2025-06-24 PROCEDURE — 87389 HIV-1 AG W/HIV-1&-2 AB AG IA: CPT | Performed by: EMERGENCY MEDICINE

## 2025-06-24 PROCEDURE — 86803 HEPATITIS C AB TEST: CPT | Performed by: EMERGENCY MEDICINE

## 2025-06-24 PROCEDURE — 96361 HYDRATE IV INFUSION ADD-ON: CPT | Mod: ER

## 2025-06-24 PROCEDURE — 63600175 PHARM REV CODE 636 W HCPCS: Mod: ER | Performed by: PHYSICIAN ASSISTANT

## 2025-06-24 PROCEDURE — 96374 THER/PROPH/DIAG INJ IV PUSH: CPT | Mod: ER

## 2025-06-24 RX ORDER — ONDANSETRON HYDROCHLORIDE 2 MG/ML
4 INJECTION, SOLUTION INTRAVENOUS
Status: COMPLETED | OUTPATIENT
Start: 2025-06-24 | End: 2025-06-24

## 2025-06-24 RX ADMIN — ONDANSETRON 4 MG: 2 INJECTION INTRAMUSCULAR; INTRAVENOUS at 11:06

## 2025-06-24 RX ADMIN — SODIUM CHLORIDE 1000 ML: 9 INJECTION, SOLUTION INTRAVENOUS at 11:06

## 2025-06-24 RX ADMIN — SODIUM CHLORIDE 1000 ML: 9 INJECTION, SOLUTION INTRAVENOUS at 01:06

## 2025-06-24 NOTE — Clinical Note
"Carla Garduno (Brandy)ley was seen and treated in our emergency department on 6/24/2025.  She may return to work on 06/25/2025.       If you have any questions or concerns, please don't hesitate to call.       SANTOSH    "

## 2025-06-24 NOTE — Clinical Note
Date: 6/24/2025  Patient: Carla Naranjo  Admitted: 6/24/2025 10:33 AM  Attending Provider: No att. providers found    Carla Naranjo or her authorized caregiver has made the decision for the [facility] Arcelia Manzanares PA-C. She or her authorized caregiver has been informed and understands the inherent risks, including death, MI.  She or her authorized caregiver has decided to accept the responsibility for this decision. Carla Naranjo and all necessary parties have been advised that she may return for further evaluation or treatment. Her condition at time of discharge was stable.  Carla Naranjo had current vital signs as follows:  BP (!) 102/54   Pulse 72   Temp  97.8 °F (36.6 °C) (Oral)   Resp 15   Wt 104.9 kg (231 lb 6 oz)   LMP 12/04/2018 (Exact Date)

## 2025-06-24 NOTE — Clinical Note
"Carla Garduno (Brandy)ley was seen and treated in our emergency department on 6/24/2025.  She may return to work on 06/25/2025.       If you have any questions or concerns, please don't hesitate to call.       RN    "

## 2025-06-24 NOTE — ED PROVIDER NOTES
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 Nausea. Was told to come to er before returning back to work. Doesn't feel so hot anymore but still having nausea. Denies chest pain, LOC.        Review of patient's allergies indicates:  No Known Allergies     HPI   HPI    2025, 11:14 AM   History obtained from the patient      History of Present Illness: Carla Naranjo is a 54 y.o. female patient who presents to the Emergency Department for becoming overheated while at work at [employer] where the air conditioner is out today.  She said at about 830 she developed chest tightness and vomited.  Denies syncope.    No further complaints or concerns at this time.           PCP: Carmencita Tao NP       Past Medical History:  Past Medical History:   Diagnosis Date    DANIELA (acute kidney injury) 2018    Anxiety     Depression     Duodenitis without bleeding 2018    History of opioid abuse     Hypertension     Hyponatremia and hypokalemia 2018    Personality disorder          Past Surgical History:  Past Surgical History:   Procedure Laterality Date     SECTION             Family History:  Family History   Problem Relation Name Age of Onset    Muscular dystrophy Paternal Grandmother      Suicide Paternal Grandmother      Alcohol abuse Father      Drug abuse Father      No Known Problems Mother      Pneumonia Maternal Grandmother      Alzheimer's disease Maternal Grandfather      Heart disease Paternal Grandfather             Social History:  Social History     Tobacco Use    Smoking status: Never    Smokeless tobacco: Current    Tobacco comments:     vape   Substance and Sexual Activity    Alcohol use: Yes     Comment: once every 3 months     Drug use: No    Sexual activity: Yes     Partners: Male       ROS     Review of Systems   Respiratory:  Positive for chest tightness.     Gastrointestinal:  Positive for nausea and vomiting.       Physical Exam      Initial Vitals [06/24/25 1006]   BP Pulse Resp Temp SpO2   119/60 89 18 97.8 °F (36.6 °C) 100 %      MAP       --         Physical Exam  Vital signs and nursing notes reviewed.  Constitutional: Patient is in NAD. Awake and alert. Well-developed and well-nourished.  Head: Atraumatic. Normocephalic.  Eyes:  EOM intact. Conjunctivae nl. No scleral icterus.  ENT: Mucous membranes are moist.   Neck: Supple.  No meningismus  Cardiovascular: Regular rate and rhythm. No murmurs, rubs, or gallops.   Pulmonary/Chest: No respiratory distress. Clear to auscultation bilaterally. No wheezing, rales, or rhonchi.  Abdominal: Soft. Non-distended. No TTP. No rebound, guarding, or rigidity. Good bowel sounds.  Genitourinary: No CVA tenderness  Musculoskeletal: Moves all extremities. No edema.   Skin: Warm and dry.  Neurological: Awake and alert. No acute focal neurological deficits are appreciated.  Psychiatric: Normal affect. Good eye contact. Appropriate in content.      ED Course          Procedures  ED Vital Signs:  Vitals:    06/24/25 1006 06/24/25 1045 06/24/25 1111 06/24/25 1143   BP: 119/60 (!) 107/55  110/60   Pulse: 89 82 86 70   Resp: 18   16   Temp: 97.8 °F (36.6 °C)      TempSrc: Oral      SpO2: 100% 100%  100%   Weight: 104.9 kg (231 lb 6 oz)       06/24/25 1233 06/24/25 1334 06/24/25 1402 06/24/25 1403   BP: 112/61 (!) 102/54 (!) 105/58    Pulse: 76 72 69    Resp: 17 15  14   Temp:       TempSrc:       SpO2: 97% 100% 100%    Weight:        06/24/25 1500   BP: 108/62   Pulse: 74   Resp: 19   Temp:    TempSrc:    SpO2: 100%   Weight:        EKG INTERPRETATION:  11:05 AM    VR:  79  No STEMI  NSR    Results for orders placed or performed during the hospital encounter of 06/24/25   EKG 12-lead    Collection Time: 06/24/25 11:05 AM   Result Value Ref Range    QRS Duration 78 ms    OHS QTC Calculation 444 ms   HCV Virus Hold Specimen    Collection  Time: 06/24/25 11:20 AM   Result Value Ref Range    Extra Tube Hold for add-ons.    Comprehensive metabolic panel    Collection Time: 06/24/25 11:20 AM   Result Value Ref Range    Sodium 137 136 - 145 mmol/L    Potassium 4.0 3.5 - 5.1 mmol/L    Chloride 106 95 - 110 mmol/L    CO2 18 (L) 23 - 29 mmol/L    Glucose 119 (H) 70 - 110 mg/dL    BUN 27 (H) 6 - 20 mg/dL    Creatinine 1.8 (H) 0.5 - 1.4 mg/dL    Calcium 8.9 8.7 - 10.5 mg/dL    Protein Total 7.2 6.0 - 8.4 gm/dL    Albumin 4.0 3.5 - 5.2 g/dL    Bilirubin Total 0.3 0.1 - 1.0 mg/dL    ALP 97 40 - 150 unit/L    AST 18 11 - 45 unit/L    ALT 20 10 - 44 unit/L    Anion Gap 13 8 - 16 mmol/L    eGFR 33 (L) >60 mL/min/1.73/m2   Troponin I #1    Collection Time: 06/24/25 11:20 AM   Result Value Ref Range    Troponin-I <0.006 <=0.026 ng/mL   BNP    Collection Time: 06/24/25 11:20 AM   Result Value Ref Range    BNP 29 0 - 99 pg/mL   CPK    Collection Time: 06/24/25 11:20 AM   Result Value Ref Range     (H) 20 - 180 U/L   CBC with Differential    Collection Time: 06/24/25 11:20 AM   Result Value Ref Range    WBC 8.81 3.90 - 12.70 K/uL    RBC 3.66 (L) 4.00 - 5.40 M/uL    HGB 11.0 (L) 12.0 - 16.0 gm/dL    HCT 32.5 (L) 37.0 - 48.5 %    MCV 89 82 - 98 fL    MCH 30.1 27.0 - 31.0 pg    MCHC 33.8 32.0 - 36.0 g/dL    RDW 13.2 11.5 - 14.5 %    Platelet Count 286 150 - 450 K/uL    MPV 10.7 9.2 - 12.9 fL    Nucleated RBC 0 <=0 /100 WBC    Neut % 75.3 (H) 38 - 73 %    Lymph % 14.0 (L) 18 - 48 %    Mono % 8.4 4 - 15 %    Eos % 1.5 <=8 %    Basophil % 0.6 <=1.9 %    Imm Grans % 0.2 0.0 - 0.5 %    Neut # 6.64 1.8 - 7.7 K/uL    Lymph # 1.23 1 - 4.8 K/uL    Mono # 0.74 0.3 - 1 K/uL    Eos # 0.13 <=0.5 K/uL    Baso # 0.05 <=0.2 K/uL    Imm Grans # 0.02 0.00 - 0.04 K/uL   Troponin I #2    Collection Time: 06/24/25  2:11 PM   Result Value Ref Range    Troponin-I <0.006 <=0.026 ng/mL             Imaging Results:  Imaging Results              X-Ray Chest AP Portable (Final result)  Result  time 06/24/25 11:31:48      Final result by Les Pineda MD (06/24/25 11:31:48)                   Impression:      1.  Negative for acute process involving the chest.    2.  Stable findings as noted above.      Electronically signed by: Les Pineda MD  Date:    06/24/2025  Time:    11:31               Narrative:    EXAMINATION:  XR CHEST AP PORTABLE    CLINICAL HISTORY:  Chest Pain;    COMPARISON:  August 1, 2021    FINDINGS:  The lungs are clear. The cardiac silhouette size is normal. The trachea is midline and the mediastinal width is normal. Negative for focal infiltrate, effusion or pneumothorax. Pulmonary vasculature is normal. Negative for osseous abnormalities. Ectatic and tortuous aorta.  Age-appropriate degenerative changes of the spine.  Marked degenerative changes of the left greater than right shoulder girdles.                                         The Emergency Provider reviewed the vital signs and test results, which are outlined above.    ED Discussion             Medication(s) given in the ER:  Medications   ondansetron injection 4 mg (4 mg Intravenous Given 6/24/25 1122)   sodium chloride 0.9% bolus 1,000 mL 1,000 mL (0 mLs Intravenous Stopped 6/24/25 1226)   sodium chloride 0.9% bolus 1,000 mL 1,000 mL (0 mLs Intravenous Stopped 6/24/25 1407)            Follow-up Information       Carmencita Tao NP In 2 days.    Specialty: Family Medicine  Contact information:  [address] [place] [id number]  [phone]                                    Medication List        ASK your doctor about these medications      amLODIPine 10 MG tablet  Commonly known as: NORVASC  TAKE 1 TABLET BY MOUTH EVERY DAY     ARIPiprazole 5 MG Tab  Commonly known as: ABILIFY  Take 1 tablet (5 mg total) by mouth once daily.     cyclobenzaprine 10 MG tablet  Commonly known as: FLEXERIL  TAKE 1 TABLET BY MOUTH EVERY DAY IN THE EVENING     * DULoxetine 60 MG capsule  Commonly known as: CYMBALTA  Take 1 capsule (60  mg total) by mouth once daily.     * DULoxetine 30 MG capsule  Commonly known as: CYMBALTA  Take 1 capsule (30 mg total) by mouth every evening.     ferrous sulfate 325 mg (65 mg iron) Tab tablet  Commonly known as: FEOSOL  TAKE 1 TABLET BY MOUTH TWICE A DAY     hydrOXYzine HCL 25 MG tablet  Commonly known as: ATARAX  Take 1 tablet (25 mg total) by mouth 3 (three) times daily as needed (panic attack).     lisinopriL 20 MG tablet  Commonly known as: PRINIVIL,ZESTRIL  TAKE 1 TABLET BY MOUTH EVERY DAY     meloxicam 15 MG tablet  Commonly known as: MOBIC  Take 1 tablet (15 mg total) by mouth every other day. as needed with meals     mirtazapine 15 MG tablet  Commonly known as: REMERON  TAKE 1 TABLET BY MOUTH EVERY DAY IN THE EVENING     mupirocin 2 % ointment  Commonly known as: BACTROBAN  Apply topically 3 (three) times daily.           * This list has 2 medication(s) that are the same as other medications prescribed for you. Read the directions carefully, and ask your doctor or other care provider to review them with you.                      Medical Decision Making        All findings were reviewed with the patient/family in detail.   All remaining questions and concerns were addressed at that time.  Patient/family has been counseled regarding the need for follow-up as well as the indication to return to the emergency room should new or worrisome developments occur.        MDM     Amount and/or Complexity of Data Reviewed  Clinical lab tests: ordered and reviewed  Tests in the radiology section of CPT®: ordered and reviewed       Additional MDM:   Differential Diagnosis:   Symptom: Chest pain. <> The follow diagnoses were considered and will be evaluated: Acute MI, Chest Pain and Musculoskeletal Pain.                 Clinical Impression:        ICD-10-CM ICD-9-CM   1. Dehydration  E86.0 276.51   2. Nausea  R11.0 787.02   3. DANIELA (acute kidney injury)  N17.9 584.9               Arcelia Manzanares, SANTOSH  06/24/25 1747

## 2025-06-24 NOTE — Clinical Note
"Carla Garduno (Brandy)ley was seen and treated in our emergency department on 6/24/2025.  She may return to work on 06/26/2025.       If you have any questions or concerns, please don't hesitate to call.       RN    "

## 2025-07-10 ENCOUNTER — LAB VISIT (OUTPATIENT)
Dept: LAB | Facility: HOSPITAL | Age: 55
End: 2025-07-10
Attending: NURSE PRACTITIONER
Payer: COMMERCIAL

## 2025-07-10 ENCOUNTER — OFFICE VISIT (OUTPATIENT)
Dept: INTERNAL MEDICINE | Facility: CLINIC | Age: 55
End: 2025-07-10
Payer: COMMERCIAL

## 2025-07-10 VITALS
BODY MASS INDEX: 34.61 KG/M2 | HEIGHT: 68 IN | TEMPERATURE: 98 F | DIASTOLIC BLOOD PRESSURE: 78 MMHG | OXYGEN SATURATION: 98 % | WEIGHT: 228.38 LBS | SYSTOLIC BLOOD PRESSURE: 128 MMHG | HEART RATE: 87 BPM

## 2025-07-10 DIAGNOSIS — R25.2 MUSCLE CRAMPS: ICD-10-CM

## 2025-07-10 DIAGNOSIS — I10 ESSENTIAL HYPERTENSION: Chronic | ICD-10-CM

## 2025-07-10 DIAGNOSIS — M19.072 OSTEOARTHRITIS OF LEFT ANKLE, UNSPECIFIED OSTEOARTHRITIS TYPE: Primary | ICD-10-CM

## 2025-07-10 LAB
ALBUMIN SERPL BCP-MCNC: 4.1 G/DL (ref 3.5–5.2)
ALP SERPL-CCNC: 92 UNIT/L (ref 40–150)
ALT SERPL W/O P-5'-P-CCNC: 24 UNIT/L (ref 10–44)
ANION GAP (OHS): 9 MMOL/L (ref 8–16)
AST SERPL-CCNC: 27 UNIT/L (ref 11–45)
BILIRUB SERPL-MCNC: 0.3 MG/DL (ref 0.1–1)
BUN SERPL-MCNC: 21 MG/DL (ref 6–20)
CALCIUM SERPL-MCNC: 9.5 MG/DL (ref 8.7–10.5)
CHLORIDE SERPL-SCNC: 106 MMOL/L (ref 95–110)
CO2 SERPL-SCNC: 23 MMOL/L (ref 23–29)
CREAT SERPL-MCNC: 1.2 MG/DL (ref 0.5–1.4)
GFR SERPLBLD CREATININE-BSD FMLA CKD-EPI: 54 ML/MIN/1.73/M2
GLUCOSE SERPL-MCNC: 115 MG/DL (ref 70–110)
MAGNESIUM SERPL-MCNC: 2 MG/DL (ref 1.6–2.6)
POTASSIUM SERPL-SCNC: 5 MMOL/L (ref 3.5–5.1)
PROT SERPL-MCNC: 7.8 GM/DL (ref 6–8.4)
SODIUM SERPL-SCNC: 138 MMOL/L (ref 136–145)

## 2025-07-10 PROCEDURE — 4010F ACE/ARB THERAPY RXD/TAKEN: CPT | Mod: CPTII,S$GLB,, | Performed by: NURSE PRACTITIONER

## 2025-07-10 PROCEDURE — 82040 ASSAY OF SERUM ALBUMIN: CPT | Mod: PO

## 2025-07-10 PROCEDURE — 3074F SYST BP LT 130 MM HG: CPT | Mod: CPTII,S$GLB,, | Performed by: NURSE PRACTITIONER

## 2025-07-10 PROCEDURE — 3008F BODY MASS INDEX DOCD: CPT | Mod: CPTII,S$GLB,, | Performed by: NURSE PRACTITIONER

## 2025-07-10 PROCEDURE — 99999 PR PBB SHADOW E&M-EST. PATIENT-LVL IV: CPT | Mod: PBBFAC,,, | Performed by: NURSE PRACTITIONER

## 2025-07-10 PROCEDURE — 1160F RVW MEDS BY RX/DR IN RCRD: CPT | Mod: CPTII,S$GLB,, | Performed by: NURSE PRACTITIONER

## 2025-07-10 PROCEDURE — 99214 OFFICE O/P EST MOD 30 MIN: CPT | Mod: S$GLB,,, | Performed by: NURSE PRACTITIONER

## 2025-07-10 PROCEDURE — 1159F MED LIST DOCD IN RCRD: CPT | Mod: CPTII,S$GLB,, | Performed by: NURSE PRACTITIONER

## 2025-07-10 PROCEDURE — 83735 ASSAY OF MAGNESIUM: CPT | Mod: PO

## 2025-07-10 PROCEDURE — 36415 COLL VENOUS BLD VENIPUNCTURE: CPT | Mod: PO

## 2025-07-10 PROCEDURE — 3078F DIAST BP <80 MM HG: CPT | Mod: CPTII,S$GLB,, | Performed by: NURSE PRACTITIONER

## 2025-07-10 RX ORDER — BACLOFEN 20 MG/1
20 TABLET ORAL NIGHTLY PRN
Qty: 30 TABLET | Refills: 0 | Status: SHIPPED | OUTPATIENT
Start: 2025-07-10 | End: 2025-08-09

## 2025-07-10 RX ORDER — DICLOFENAC SODIUM 75 MG/1
75 TABLET, DELAYED RELEASE ORAL 2 TIMES DAILY PRN
Qty: 20 TABLET | Refills: 0 | Status: SHIPPED | OUTPATIENT
Start: 2025-07-10 | End: 2025-07-20

## 2025-07-10 NOTE — PROGRESS NOTES
"Subjective:       Patient ID: Carla Naranjo is a 54 y.o. female.    Chief Complaint: Leg Pain ( X 6 month)    History of Present Illness    HPI:  Ms. Naranjo reports pain and swelling in her left foot and ankle, with sharp pain on the side of her foot extending up her leg. She has severe muscle cramps at night for the past 3-4 nights, intense enough to necessitate getting out of bed and standing to alleviate them. Her foot swells significantly, sometimes to a considerable size, subsiding overnight but returning during the day, especially in the afternoon.    She has been diagnosed with severe degenerative changes and arthritis in her ankle and foot. Dr. Curry, an orthopedic specialist, evaluated her approximately 3 months ago and administered a steroid injection, which provided relief for about a week. She has been prescribed a brace but reports inconsistent use due to work restrictions.    She works at [employer], which requires constant standing, exacerbating her symptoms. She takes Meloxicam in the morning and Flexeril at night as prescribed. She uses a heating pad for relief and a foot soak with a massaging tub, which provides temporary relief. Ice does not help.    About 2 weeks ago, she visited the ER due to severe dehydration while at work, with profuse sweating and feeling faint. The ER diagnosed her with dehydration, which she attributes to the lack of air conditioning at her workplace and the physical demands of her job.    Her sleep has been poor due to the pain and cramping. She reports frequent stomach upset but is unsure if it is due to nerves or stress.    She denies any pain or symptoms in her right leg.    TEST RESULTS:  - CMP 6/2025: reduced kidney function  - LLE Ultrasound: January 2025, ruled out blood clots    IMAGING:  - X-ray Left Ankle: 4/2025, results: "Redemonstration of the advanced degenerative changes and irregularity of the tibiotalar joint. Well corticated densities project " "distal to the medial malleolus and deformities of the distal tibia/fibula suggestive of prior trauma. Soft tissue swelling around the ankle. Midfoot hypertrophy. Calcaneal spurring."  - X-ray Foot: 2025, results: "No evidence of acute fracture. Severe degenerative changes of the tibiotalar joint. Hallux valgus. Severe degenerative changes of the 1st MTP joint. Dorsal calcaneal spurring. Midfoot hypertrophy. Soft tissue swelling around the ankle. Dorsal calcaneal spurring "       [facility]    Problem List[1]    Family History   Problem Relation Name Age of Onset    Muscular dystrophy Paternal Grandmother      Suicide Paternal Grandmother      Alcohol abuse Father      Drug abuse Father      No Known Problems Mother      Pneumonia Maternal Grandmother      Alzheimer's disease Maternal Grandfather      Heart disease Paternal Grandfather       Past Surgical History:   Procedure Laterality Date     SECTION         Current Medications[2]    Review of Systems   Constitutional:  Negative for activity change, appetite change, chills, fatigue and fever.   Respiratory:  Negative for apnea, cough, chest tightness, shortness of breath and wheezing.    Cardiovascular:  Negative for chest pain, palpitations and leg swelling.   Gastrointestinal:  Negative for abdominal pain, constipation, diarrhea, nausea and vomiting.   Musculoskeletal:  Positive for arthralgias, joint swelling and myalgias. Negative for back pain and gait problem.   Neurological:  Negative for dizziness, syncope, weakness, light-headedness, numbness and headaches.       Objective:   /78 (BP Location: Left arm, Patient Position: Sitting)   Pulse 87   Temp 97.9 °F (36.6 °C) (Tympanic)   Ht 5' 8" (1.727 m)   Wt 103.6 kg (228 lb 6.3 oz)   LMP 2018 (Exact Date)   SpO2 98%   BMI 34.73 kg/m²      Physical Exam  Vitals reviewed.   Constitutional:       General: She is not in acute distress.     Appearance: Normal appearance. She is well-developed. " She is obese. She is not ill-appearing, toxic-appearing or diaphoretic.   HENT:      Head: Normocephalic.   Cardiovascular:      Rate and Rhythm: Normal rate.   Pulmonary:      Effort: Pulmonary effort is normal. No respiratory distress.   Musculoskeletal:      Cervical back: Normal range of motion and neck supple.      Left ankle: Swelling present. Tenderness present over the lateral malleolus and medial malleolus.   Skin:     General: Skin is warm and dry.      Coloration: Skin is not pale.      Findings: No erythema.   Neurological:      Mental Status: She is alert and oriented to person, place, and time.   Psychiatric:         Mood and Affect: Mood normal.         Behavior: Behavior normal.         Assessment & Plan     Assessment & Plan    PRIMARY OSTEOARTHRITIS, LEFT ANKLE AND FOOT:  - Assessed chronic ankle pain and swelling, noting significant degenerative changes and arthritis based on previous imaging.  - Ms. Naranjo experiences severe arthritis with stabbing, needle-like pain and swelling.  - Reviewed prior imaging shows advanced degenerative changes and prior ortho note.  - Referred patient back to Dr. Curry (orthopedics) for follow-up.  - Medication changes include switching from Flexeril to baclofen 20 mg for muscle relaxation and from meloxicam to diclofenac (twice daily) for anti-inflammatory effect.  - Advised that standing and sitting may be less stressful on the ankle than constant walking.    MUSCLE SPASM OF CALF:  - Documented severe muscle cramps in the calf, described as Ron horses, occurring at night and requiring pressure for relief.  - Update labs to follow up with abnormalities from labs completed in ER 2 weeks prior  - Prescribed baclofen 20 mg at bedtime for muscle relaxation (with option to start at 10 mg/half tablet if concerned about drowsiness) and discontinued Flexeril (cyclobenzaprine) due to long-term use and ineffectiveness.    DEHYDRATION:  - Documented history of  "dehydration with symptoms of fainting and excessive sweating that led to an ER visit.  - Ms. Naranjo has started taking electrolyte packets to help with hydration.  - Ordered CMP and magnesium.           1. Osteoarthritis of left ankle, unspecified osteoarthritis type  -     diclofenac (VOLTAREN) 75 MG EC tablet; Take 1 tablet (75 mg total) by mouth 2 (two) times daily as needed (ankel pain).  Dispense: 20 tablet; Refill: 0  -     baclofen (LIORESAL) 20 MG tablet; Take 1 tablet (20 mg total) by mouth nightly as needed (muscle spasm).  Dispense: 30 tablet; Refill: 0    2. Muscle cramps  -     Comprehensive Metabolic Panel; Future; Expected date: 07/10/2025  -     Magnesium; Future; Expected date: 07/10/2025    3. Essential hypertension  Assessment & Plan:  Stable. BP controlled. Continue amlodipine and lisinopril              TAYO Whitfield    This note was generated with the assistance of ambient listening technology. Verbal consent was obtained by the patient and accompanying visitor(s) for the recording of patient appointment to facilitate this note. I attest to having reviewed and edited the generated note for accuracy, though some syntax or spelling errors may persist. Please contact the author of this note for any clarification.       Portions of this note may have been created with voice recognition software. Occasional "wrong-word" or "sound-a-like" substitutions may have occurred due to the inherent limitations of voice recognition software. Please, read the note carefully and recognize, using context, where substitutions have occurred.             [1]   Patient Active Problem List  Diagnosis    Acute cystitis    Hypokalemia    Essential hypertension    Anxiety and depression    Elevated liver enzymes    Vaginal bleeding    Moderate episode of recurrent major depressive disorder    Heart murmur   [2]   Current Outpatient Medications:     amLODIPine (NORVASC) 10 MG tablet, TAKE 1 TABLET BY MOUTH " EVERY DAY, Disp: 90 tablet, Rfl: 1    ARIPiprazole (ABILIFY) 5 MG Tab, Take 1 tablet (5 mg total) by mouth once daily., Disp: 30 tablet, Rfl: 5    DULoxetine (CYMBALTA) 30 MG capsule, Take 1 capsule (30 mg total) by mouth every evening., Disp: 30 capsule, Rfl: 11    DULoxetine (CYMBALTA) 60 MG capsule, Take 1 capsule (60 mg total) by mouth once daily., Disp: 30 capsule, Rfl: 11    ferrous sulfate (FEOSOL) 325 mg (65 mg iron) Tab tablet, TAKE 1 TABLET BY MOUTH TWICE A DAY, Disp: 180 tablet, Rfl: 0    hydrOXYzine HCL (ATARAX) 25 MG tablet, Take 1 tablet (25 mg total) by mouth 3 (three) times daily as needed (panic attack)., Disp: 30 tablet, Rfl: 0    lisinopriL (PRINIVIL,ZESTRIL) 20 MG tablet, TAKE 1 TABLET BY MOUTH EVERY DAY, Disp: 90 tablet, Rfl: 1    mirtazapine (REMERON) 15 MG tablet, TAKE 1 TABLET BY MOUTH EVERY DAY IN THE EVENING, Disp: 90 tablet, Rfl: 1    mupirocin (BACTROBAN) 2 % ointment, Apply topically 3 (three) times daily., Disp: 22 g, Rfl: 0    baclofen (LIORESAL) 20 MG tablet, Take 1 tablet (20 mg total) by mouth nightly as needed (muscle spasm)., Disp: 30 tablet, Rfl: 0    diclofenac (VOLTAREN) 75 MG EC tablet, Take 1 tablet (75 mg total) by mouth 2 (two) times daily as needed (ankel pain)., Disp: 20 tablet, Rfl: 0

## 2025-07-10 NOTE — LETTER
July 10, 2025      [facility]  [address]  [place] [address]  Phone: [phone]  Fax: [phone]       Patient: Carla Naranjo   YOB: 1970  Date of Visit: 07/10/2025    To Whom It May Concern:    Nito Naranjo  was at [facility] on 07/10/2025. The patient may return to work/school on 7/11/2025 with no restrictions. If you have any questions or concerns, or if I can be of further assistance, please do not hesitate to contact me.    Sincerely,      TAYO Hinton

## 2025-07-10 NOTE — LETTER
July 10, 2025      [facility]  [address]  [place] [address]  Phone: [phone]  Fax: [phone]       Patient: Carla Naranjo   YOB: 1970  Date of Visit: 07/10/2025    To Whom It May Concern:    Nito Naranjo  was at [facility] on 07/10/2025. The patient may return to work/school on 7/10/2025 with no restrictions. If you have any questions or concerns, or if I can be of further assistance, please do not hesitate to contact me.    Sincerely,      TAYO Hinton

## 2025-08-15 ENCOUNTER — HOSPITAL ENCOUNTER (EMERGENCY)
Facility: HOSPITAL | Age: 55
Discharge: HOME | End: 2025-08-15
Attending: EMERGENCY MEDICINE
Payer: COMMERCIAL

## 2025-08-15 VITALS
OXYGEN SATURATION: 100 % | DIASTOLIC BLOOD PRESSURE: 74 MMHG | SYSTOLIC BLOOD PRESSURE: 163 MMHG | BODY MASS INDEX: 41.41 KG/M2 | RESPIRATION RATE: 18 BRPM | HEIGHT: 63 IN | WEIGHT: 233.69 LBS | TEMPERATURE: 98 F | HEART RATE: 64 BPM

## 2025-08-15 DIAGNOSIS — I10 HYPERTENSION, UNSPECIFIED TYPE: Primary | ICD-10-CM

## 2025-08-15 DIAGNOSIS — R55 NEAR SYNCOPE: ICD-10-CM

## 2025-08-15 LAB
ABSOLUTE EOSINOPHIL (OHS): 0.11 K/UL
ABSOLUTE MONOCYTE (OHS): 0.45 K/UL (ref 0.3–1)
ABSOLUTE NEUTROPHIL COUNT (OHS): 4.23 K/UL (ref 1.8–7.7)
ALBUMIN SERPL BCP-MCNC: 3.9 G/DL (ref 3.5–5.2)
ALP SERPL-CCNC: 127 UNIT/L (ref 40–150)
ALT SERPL W/O P-5'-P-CCNC: 42 UNIT/L (ref 10–44)
ANION GAP (OHS): 9 MMOL/L (ref 8–16)
AST SERPL-CCNC: 41 UNIT/L (ref 11–45)
BASOPHILS # BLD AUTO: 0.04 K/UL
BASOPHILS NFR BLD AUTO: 0.6 %
BILIRUB SERPL-MCNC: 0.3 MG/DL (ref 0.1–1)
BILIRUB UR QL STRIP.AUTO: NEGATIVE
BUN SERPL-MCNC: 17 MG/DL (ref 6–20)
CALCIUM SERPL-MCNC: 8.5 MG/DL (ref 8.7–10.5)
CHLORIDE SERPL-SCNC: 109 MMOL/L (ref 95–110)
CK SERPL-CCNC: 175 U/L (ref 20–180)
CLARITY UR: CLEAR
CO2 SERPL-SCNC: 21 MMOL/L (ref 23–29)
COLOR UR AUTO: YELLOW
CREAT SERPL-MCNC: 1.1 MG/DL (ref 0.5–1.4)
ERYTHROCYTE [DISTWIDTH] IN BLOOD BY AUTOMATED COUNT: 13.4 % (ref 11.5–14.5)
GFR SERPLBLD CREATININE-BSD FMLA CKD-EPI: 60 ML/MIN/1.73/M2
GLUCOSE SERPL-MCNC: 109 MG/DL (ref 70–110)
GLUCOSE UR QL STRIP: NEGATIVE
HCT VFR BLD AUTO: 31.4 % (ref 37–48.5)
HGB BLD-MCNC: 10.3 GM/DL (ref 12–16)
HGB UR QL STRIP: NEGATIVE
IMM GRANULOCYTES # BLD AUTO: 0.02 K/UL (ref 0–0.04)
IMM GRANULOCYTES NFR BLD AUTO: 0.3 % (ref 0–0.5)
KETONES UR QL STRIP: NEGATIVE
LEUKOCYTE ESTERASE UR QL STRIP: NEGATIVE
LYMPHOCYTES # BLD AUTO: 1.64 K/UL (ref 1–4.8)
MCH RBC QN AUTO: 30.1 PG (ref 27–31)
MCHC RBC AUTO-ENTMCNC: 32.8 G/DL (ref 32–36)
MCV RBC AUTO: 92 FL (ref 82–98)
NITRITE UR QL STRIP: NEGATIVE
NT-PROBNP SERPL-MCNC: 400 PG/ML
NUCLEATED RBC (/100WBC) (OHS): 0 /100 WBC
PH UR STRIP: 6 [PH]
PLATELET # BLD AUTO: 246 K/UL (ref 150–450)
PMV BLD AUTO: 11.2 FL (ref 9.2–12.9)
POTASSIUM SERPL-SCNC: 4 MMOL/L (ref 3.5–5.1)
PROT SERPL-MCNC: 6.6 GM/DL (ref 6–8.4)
PROT UR QL STRIP: NEGATIVE
RBC # BLD AUTO: 3.42 M/UL (ref 4–5.4)
RELATIVE EOSINOPHIL (OHS): 1.7 %
RELATIVE LYMPHOCYTE (OHS): 25.3 % (ref 18–48)
RELATIVE MONOCYTE (OHS): 6.9 % (ref 4–15)
RELATIVE NEUTROPHIL (OHS): 65.2 % (ref 38–73)
SODIUM SERPL-SCNC: 139 MMOL/L (ref 136–145)
SP GR UR STRIP: 1.01
TROPONIN I SERPL HS-MCNC: 7 NG/L
TSH SERPL-ACNC: 1.54 UIU/ML (ref 0.4–4)
UROBILINOGEN UR STRIP-ACNC: NEGATIVE EU/DL
WBC # BLD AUTO: 6.49 K/UL (ref 3.9–12.7)

## 2025-08-15 PROCEDURE — 99284 EMERGENCY DEPT VISIT MOD MDM: CPT | Mod: 25,ER

## 2025-08-15 PROCEDURE — 81003 URINALYSIS AUTO W/O SCOPE: CPT | Mod: ER | Performed by: EMERGENCY MEDICINE

## 2025-08-15 PROCEDURE — 84443 ASSAY THYROID STIM HORMONE: CPT | Mod: ER | Performed by: EMERGENCY MEDICINE

## 2025-08-15 PROCEDURE — 82550 ASSAY OF CK (CPK): CPT | Mod: ER | Performed by: EMERGENCY MEDICINE

## 2025-08-15 PROCEDURE — 93010 ELECTROCARDIOGRAM REPORT: CPT | Mod: ,,, | Performed by: INTERNAL MEDICINE

## 2025-08-15 PROCEDURE — 84484 ASSAY OF TROPONIN QUANT: CPT | Mod: ER | Performed by: EMERGENCY MEDICINE

## 2025-08-15 PROCEDURE — 85025 COMPLETE CBC W/AUTO DIFF WBC: CPT | Mod: ER | Performed by: EMERGENCY MEDICINE

## 2025-08-15 PROCEDURE — 93005 ELECTROCARDIOGRAM TRACING: CPT | Mod: ER

## 2025-08-15 PROCEDURE — 83880 ASSAY OF NATRIURETIC PEPTIDE: CPT | Mod: ER | Performed by: EMERGENCY MEDICINE

## 2025-08-15 PROCEDURE — 82247 BILIRUBIN TOTAL: CPT | Mod: ER | Performed by: EMERGENCY MEDICINE

## 2025-08-16 LAB
HOLD SPECIMEN: NORMAL
OHS QRS DURATION: 72 MS
OHS QTC CALCULATION: 424 MS

## 2025-08-19 DIAGNOSIS — M19.072 OSTEOARTHRITIS OF LEFT ANKLE, UNSPECIFIED OSTEOARTHRITIS TYPE: ICD-10-CM

## 2025-08-20 RX ORDER — DICLOFENAC SODIUM 75 MG/1
TABLET, DELAYED RELEASE ORAL
Qty: 20 TABLET | Refills: 0 | Status: SHIPPED | OUTPATIENT
Start: 2025-08-20

## 2025-08-20 RX ORDER — BACLOFEN 20 MG/1
20 TABLET ORAL NIGHTLY PRN
Qty: 30 TABLET | Refills: 0 | Status: SHIPPED | OUTPATIENT
Start: 2025-08-20 | End: 2025-09-19